# Patient Record
Sex: FEMALE | Race: WHITE | NOT HISPANIC OR LATINO
[De-identification: names, ages, dates, MRNs, and addresses within clinical notes are randomized per-mention and may not be internally consistent; named-entity substitution may affect disease eponyms.]

---

## 2018-11-09 ENCOUNTER — APPOINTMENT (OUTPATIENT)
Dept: FAMILY MEDICINE | Facility: CLINIC | Age: 33
End: 2018-11-09
Payer: COMMERCIAL

## 2018-11-09 VITALS
HEIGHT: 63 IN | DIASTOLIC BLOOD PRESSURE: 70 MMHG | SYSTOLIC BLOOD PRESSURE: 120 MMHG | WEIGHT: 135 LBS | OXYGEN SATURATION: 98 % | HEART RATE: 93 BPM | BODY MASS INDEX: 23.92 KG/M2

## 2018-11-09 DIAGNOSIS — Z00.00 ENCOUNTER FOR GENERAL ADULT MEDICAL EXAMINATION W/OUT ABNORMAL FINDINGS: ICD-10-CM

## 2018-11-09 DIAGNOSIS — Z82.49 FAMILY HISTORY OF ISCHEMIC HEART DISEASE AND OTHER DISEASES OF THE CIRCULATORY SYSTEM: ICD-10-CM

## 2018-11-09 DIAGNOSIS — Z13.220 ENCOUNTER FOR SCREENING FOR LIPOID DISORDERS: ICD-10-CM

## 2018-11-09 DIAGNOSIS — Z78.9 OTHER SPECIFIED HEALTH STATUS: ICD-10-CM

## 2018-11-09 DIAGNOSIS — G89.29 PAIN IN RIGHT KNEE: ICD-10-CM

## 2018-11-09 DIAGNOSIS — M25.562 PAIN IN RIGHT KNEE: ICD-10-CM

## 2018-11-09 DIAGNOSIS — Z13.1 ENCOUNTER FOR SCREENING FOR DIABETES MELLITUS: ICD-10-CM

## 2018-11-09 DIAGNOSIS — M25.561 PAIN IN RIGHT KNEE: ICD-10-CM

## 2018-11-09 PROCEDURE — 99385 PREV VISIT NEW AGE 18-39: CPT

## 2018-11-09 PROCEDURE — 99204 OFFICE O/P NEW MOD 45 MIN: CPT

## 2018-11-09 NOTE — PHYSICAL EXAM
[No Acute Distress] : no acute distress [Well-Appearing] : well-appearing [Normal Sclera/Conjunctiva] : normal sclera/conjunctiva [PERRL] : pupils equal round and reactive to light [Normal Outer Ear/Nose] : the outer ears and nose were normal in appearance [Normal Oropharynx] : the oropharynx was normal [Supple] : supple [No Respiratory Distress] : no respiratory distress  [Clear to Auscultation] : lungs were clear to auscultation bilaterally [Normal Rate] : normal rate  [Regular Rhythm] : with a regular rhythm [Soft] : abdomen soft [Non Tender] : non-tender [Non-distended] : non-distended [Normal Bowel Sounds] : normal bowel sounds [No CVA Tenderness] : no CVA  tenderness [No Joint Swelling] : no joint swelling [Grossly Normal Strength/Tone] : grossly normal strength/tone [No Rash] : no rash [Normal Gait] : normal gait [Coordination Grossly Intact] : coordination grossly intact [No Focal Deficits] : no focal deficits [Normal Affect] : the affect was normal [Normal Insight/Judgement] : insight and judgment were intact [Normal Appearance] : normal in appearance [No Masses] : no palpable masses [No Nipple Discharge] : no nipple discharge [No Axillary Lymphadenopathy] : no axillary lymphadenopathy

## 2018-11-09 NOTE — PLAN
[FreeTextEntry1] : HCM- f/u routine labs, gyn referral to establish care, breast US as pt with family history and dense findings on exam\par Knee pain- f/u RF and TEVIN

## 2018-11-09 NOTE — REVIEW OF SYSTEMS
[Fever] : no fever [Chills] : no chills [Fatigue] : no fatigue [Discharge] : no discharge [Pain] : no pain [Redness] : no redness [Earache] : no earache [Hearing Loss] : no hearing loss [Nosebleed] : no nosebleeds [Chest Pain] : no chest pain [Palpitations] : no palpitations [Shortness Of Breath] : no shortness of breath [Wheezing] : no wheezing [Abdominal Pain] : no abdominal pain [Nausea] : no nausea [Constipation] : no constipation [Diarrhea] : diarrhea [Dysuria] : no dysuria [Incontinence] : no incontinence [Hematuria] : no hematuria [Frequency] : no frequency [Joint Pain] : no joint pain [Joint Stiffness] : no joint stiffness [Joint Swelling] : no joint swelling [Itching] : no itching [Skin Rash] : no skin rash [Headache] : no headache [Dizziness] : no dizziness [Fainting] : no fainting [Anxiety] : no anxiety [Depression] : no depression

## 2018-11-09 NOTE — HISTORY OF PRESENT ILLNESS
[FreeTextEntry1] : CPE [de-identified] : Here for CPE and to establish care. Feels well. States when she works out she gets knee pain that worsens with weather. Denies associated fever\par Social History: Household members: spouse. She is . The patient is never a smoker. She reports no alcohol use. She has never used illicit drugs. AhsjbMpbgkyp7Hro NfkrfMbhblju39Gsfte \par General Health: HvdguVbwfplh41Uzx IpmsxNzporpy80Czpht The patient's health since the last visit is described as good. DyfwwXxlpvyd26Vad EarblWhpehbr78Ldrgq She has regular dental visits. She denies vision problems. She denies hearing loss. QjffaUbjqvqd86Ixa KdkwlNtmcjzt27Lgdjr \par Lifestyle:. She does not have any weight concerns. She exercises regularly. She exercises 2-3 x week  for 30 or more minutes per session. Exercise includes running/jogging and strength training. JchakEtevhhz09Zfl FwvmwPnqqvpi717Kxhtf \par Reproductive health:. she reports abnormal menses. she is sexually active. pregnancy history:  none PhakfKyblpbg383Sdj HljhhLhwnvup036Sfrvj \par Screening: YdmqxKcyrqej747Gfj XkibjWajcrrl146Hhwxw Cervical cancer screening includes a pap smear performed last year. She hasn't been previously screened for colorectal cancer. WcfzvSfytgfb247Laa PrwgjCerwmnn134Tdtgy \par Metabolic screening includes lipid profile performed within the past five years, glucose screening performed last year and thyroid function test performed last year. ZgpixIaimiir696Pzl DjzxkYucztky139Kqjbt AicozItqdmzc709Xtk TkhnmOcljfbl31Rmwrn TjpdxJrwpgqm99Yvb QggckGcqnaek801Wivpz NtiyoNaaxlyo328Rei ,TgbocQuapwq-QYBSG2hok4bu7-[id number]\par

## 2018-11-12 LAB
25(OH)D3 SERPL-MCNC: 27 NG/ML
ALBUMIN SERPL ELPH-MCNC: 5.2 G/DL
ALP BLD-CCNC: 61 U/L
ALT SERPL-CCNC: 11 U/L
ANA SER IF-ACNC: NEGATIVE
ANION GAP SERPL CALC-SCNC: 15 MMOL/L
AST SERPL-CCNC: 21 U/L
BASOPHILS # BLD AUTO: 0.04 K/UL
BASOPHILS NFR BLD AUTO: 0.4 %
BILIRUB SERPL-MCNC: 0.3 MG/DL
BUN SERPL-MCNC: 12 MG/DL
CALCIUM SERPL-MCNC: 9.9 MG/DL
CHLORIDE SERPL-SCNC: 102 MMOL/L
CHOLEST SERPL-MCNC: 176 MG/DL
CHOLEST/HDLC SERPL: 2.8 RATIO
CO2 SERPL-SCNC: 25 MMOL/L
CREAT SERPL-MCNC: 0.85 MG/DL
EOSINOPHIL # BLD AUTO: 0.16 K/UL
EOSINOPHIL NFR BLD AUTO: 1.7 %
GLUCOSE SERPL-MCNC: 93 MG/DL
HBA1C MFR BLD HPLC: 5.2 %
HCT VFR BLD CALC: 42.5 %
HDLC SERPL-MCNC: 63 MG/DL
HGB BLD-MCNC: 14.6 G/DL
IMM GRANULOCYTES NFR BLD AUTO: 0.2 %
LDLC SERPL CALC-MCNC: 100 MG/DL
LYMPHOCYTES # BLD AUTO: 2.07 K/UL
LYMPHOCYTES NFR BLD AUTO: 22.4 %
MAN DIFF?: NORMAL
MCHC RBC-ENTMCNC: 29.9 PG
MCHC RBC-ENTMCNC: 34.4 GM/DL
MCV RBC AUTO: 87.1 FL
MONOCYTES # BLD AUTO: 0.49 K/UL
MONOCYTES NFR BLD AUTO: 5.3 %
NEUTROPHILS # BLD AUTO: 6.45 K/UL
NEUTROPHILS NFR BLD AUTO: 70 %
PLATELET # BLD AUTO: 282 K/UL
POTASSIUM SERPL-SCNC: 4.4 MMOL/L
PROT SERPL-MCNC: 8.2 G/DL
RBC # BLD: 4.88 M/UL
RBC # FLD: 12.5 %
RHEUMATOID FACT SER QL: <10 IU/ML
SODIUM SERPL-SCNC: 142 MMOL/L
TRIGL SERPL-MCNC: 63 MG/DL
TSH SERPL-ACNC: 1.42 UIU/ML
WBC # FLD AUTO: 9.23 K/UL

## 2018-12-13 ENCOUNTER — OUTPATIENT (OUTPATIENT)
Dept: OUTPATIENT SERVICES | Facility: HOSPITAL | Age: 33
LOS: 1 days | End: 2018-12-13

## 2018-12-13 ENCOUNTER — APPOINTMENT (OUTPATIENT)
Dept: ULTRASOUND IMAGING | Facility: CLINIC | Age: 33
End: 2018-12-13

## 2018-12-13 DIAGNOSIS — Z00.8 ENCOUNTER FOR OTHER GENERAL EXAMINATION: ICD-10-CM

## 2018-12-13 DIAGNOSIS — Z00.00 ENCOUNTER FOR GENERAL ADULT MEDICAL EXAMINATION WITHOUT ABNORMAL FINDINGS: ICD-10-CM

## 2019-04-23 ENCOUNTER — ASOB RESULT (OUTPATIENT)
Age: 34
End: 2019-04-23

## 2019-04-23 ENCOUNTER — APPOINTMENT (OUTPATIENT)
Dept: ANTEPARTUM | Facility: CLINIC | Age: 34
End: 2019-04-23
Payer: COMMERCIAL

## 2019-04-23 PROCEDURE — 76801 OB US < 14 WKS SINGLE FETUS: CPT

## 2019-04-23 PROCEDURE — 76813 OB US NUCHAL MEAS 1 GEST: CPT | Mod: 59

## 2019-04-25 ENCOUNTER — TRANSCRIPTION ENCOUNTER (OUTPATIENT)
Age: 34
End: 2019-04-25

## 2019-06-19 ENCOUNTER — APPOINTMENT (OUTPATIENT)
Dept: ANTEPARTUM | Facility: CLINIC | Age: 34
End: 2019-06-19
Payer: COMMERCIAL

## 2019-06-19 ENCOUNTER — ASOB RESULT (OUTPATIENT)
Age: 34
End: 2019-06-19

## 2019-06-19 PROCEDURE — 76805 OB US >/= 14 WKS SNGL FETUS: CPT

## 2019-08-20 ENCOUNTER — OUTPATIENT (OUTPATIENT)
Dept: INPATIENT UNIT | Facility: HOSPITAL | Age: 34
LOS: 1 days | Discharge: OTHER INSTITUTION | End: 2019-08-20
Payer: COMMERCIAL

## 2019-08-20 DIAGNOSIS — Z34.90 ENCOUNTER FOR SUPERVISION OF NORMAL PREGNANCY, UNSPECIFIED, UNSPECIFIED TRIMESTER: ICD-10-CM

## 2019-08-20 DIAGNOSIS — O47.9 FALSE LABOR, UNSPECIFIED: ICD-10-CM

## 2019-08-20 PROCEDURE — 86901 BLOOD TYPING SEROLOGIC RH(D): CPT

## 2019-08-20 PROCEDURE — 86850 RBC ANTIBODY SCREEN: CPT

## 2019-08-20 PROCEDURE — 36415 COLL VENOUS BLD VENIPUNCTURE: CPT

## 2019-08-20 PROCEDURE — G0463: CPT

## 2019-08-20 PROCEDURE — 85025 COMPLETE CBC W/AUTO DIFF WBC: CPT

## 2019-08-20 PROCEDURE — 86780 TREPONEMA PALLIDUM: CPT

## 2019-08-20 PROCEDURE — 86900 BLOOD TYPING SEROLOGIC ABO: CPT

## 2019-08-20 PROCEDURE — 81001 URINALYSIS AUTO W/SCOPE: CPT

## 2019-08-20 PROCEDURE — 84112 EVAL AMNIOTIC FLUID PROTEIN: CPT

## 2019-08-21 ENCOUNTER — INPATIENT (INPATIENT)
Facility: HOSPITAL | Age: 34
LOS: 1 days | Discharge: HOME | End: 2019-08-23
Attending: OBSTETRICS & GYNECOLOGY | Admitting: OBSTETRICS & GYNECOLOGY
Payer: COMMERCIAL

## 2019-08-21 VITALS
TEMPERATURE: 98 F | DIASTOLIC BLOOD PRESSURE: 87 MMHG | HEART RATE: 109 BPM | OXYGEN SATURATION: 99 % | SYSTOLIC BLOOD PRESSURE: 150 MMHG | RESPIRATION RATE: 22 BRPM

## 2019-08-21 LAB
ALBUMIN SERPL ELPH-MCNC: 3.6 G/DL — SIGNIFICANT CHANGE UP (ref 3.3–5)
ALP SERPL-CCNC: 103 U/L — SIGNIFICANT CHANGE UP (ref 40–120)
ALT FLD-CCNC: 14 U/L — SIGNIFICANT CHANGE UP (ref 10–45)
AMNISURE ROM (RUPTURE OF MEMBRANES): POSITIVE
ANION GAP SERPL CALC-SCNC: 14 MMOL/L — SIGNIFICANT CHANGE UP (ref 5–17)
APPEARANCE UR: CLEAR — SIGNIFICANT CHANGE UP
APPEARANCE UR: CLEAR — SIGNIFICANT CHANGE UP
APTT BLD: 27.1 SEC — LOW (ref 27.5–36.3)
AST SERPL-CCNC: 16 U/L — SIGNIFICANT CHANGE UP (ref 10–40)
BACTERIA # UR AUTO: NEGATIVE — SIGNIFICANT CHANGE UP
BASOPHILS # BLD AUTO: 0 K/UL — SIGNIFICANT CHANGE UP (ref 0–0.2)
BASOPHILS # BLD AUTO: 0.05 K/UL — SIGNIFICANT CHANGE UP (ref 0–0.2)
BASOPHILS NFR BLD AUTO: 0.1 % — SIGNIFICANT CHANGE UP (ref 0–2)
BASOPHILS NFR BLD AUTO: 0.4 % — SIGNIFICANT CHANGE UP (ref 0–2)
BILIRUB SERPL-MCNC: 0.1 MG/DL — LOW (ref 0.2–1.2)
BILIRUB UR-MCNC: NEGATIVE — SIGNIFICANT CHANGE UP
BILIRUB UR-MCNC: NEGATIVE — SIGNIFICANT CHANGE UP
BLD GP AB SCN SERPL QL: NEGATIVE — SIGNIFICANT CHANGE UP
BUN SERPL-MCNC: 7 MG/DL — SIGNIFICANT CHANGE UP (ref 7–23)
CALCIUM SERPL-MCNC: 9.2 MG/DL — SIGNIFICANT CHANGE UP (ref 8.4–10.5)
CHLORIDE SERPL-SCNC: 105 MMOL/L — SIGNIFICANT CHANGE UP (ref 96–108)
CO2 SERPL-SCNC: 17 MMOL/L — LOW (ref 22–31)
COLOR SPEC: SIGNIFICANT CHANGE UP
COLOR SPEC: YELLOW — SIGNIFICANT CHANGE UP
CREAT SERPL-MCNC: 0.6 MG/DL — SIGNIFICANT CHANGE UP (ref 0.5–1.3)
DIFF PNL FLD: ABNORMAL
DIFF PNL FLD: ABNORMAL
EOSINOPHIL # BLD AUTO: 0 K/UL — SIGNIFICANT CHANGE UP (ref 0–0.5)
EOSINOPHIL # BLD AUTO: 0.1 K/UL — SIGNIFICANT CHANGE UP (ref 0–0.5)
EOSINOPHIL NFR BLD AUTO: 0.2 % — SIGNIFICANT CHANGE UP (ref 0–6)
EOSINOPHIL NFR BLD AUTO: 0.7 % — SIGNIFICANT CHANGE UP (ref 0–6)
EPI CELLS # UR: 2 /HPF — SIGNIFICANT CHANGE UP
FIBRINOGEN PPP-MCNC: 1000 MG/DL — HIGH (ref 350–510)
GLUCOSE SERPL-MCNC: 114 MG/DL — HIGH (ref 70–99)
GLUCOSE UR QL: NEGATIVE MG/DL — SIGNIFICANT CHANGE UP
GLUCOSE UR QL: NEGATIVE — SIGNIFICANT CHANGE UP
HCT VFR BLD CALC: 36.5 % — SIGNIFICANT CHANGE UP (ref 34.5–45)
HCT VFR BLD CALC: 38 % — SIGNIFICANT CHANGE UP (ref 34.5–45)
HGB BLD-MCNC: 12.3 G/DL — SIGNIFICANT CHANGE UP (ref 11.5–15.5)
HGB BLD-MCNC: 12.9 G/DL — SIGNIFICANT CHANGE UP (ref 11.5–15.5)
HYALINE CASTS # UR AUTO: 1 /LPF — SIGNIFICANT CHANGE UP (ref 0–2)
IMM GRANULOCYTES NFR BLD AUTO: 0.7 % — SIGNIFICANT CHANGE UP (ref 0–1.5)
INR BLD: 0.9 RATIO — SIGNIFICANT CHANGE UP (ref 0.88–1.16)
KETONES UR-MCNC: NEGATIVE — SIGNIFICANT CHANGE UP
KETONES UR-MCNC: SIGNIFICANT CHANGE UP
LEUKOCYTE ESTERASE UR-ACNC: NEGATIVE — SIGNIFICANT CHANGE UP
LEUKOCYTE ESTERASE UR-ACNC: NEGATIVE — SIGNIFICANT CHANGE UP
LYMPHOCYTES # BLD AUTO: 1.3 K/UL — SIGNIFICANT CHANGE UP (ref 1–3.3)
LYMPHOCYTES # BLD AUTO: 17.1 % — SIGNIFICANT CHANGE UP (ref 13–44)
LYMPHOCYTES # BLD AUTO: 2.31 K/UL — SIGNIFICANT CHANGE UP (ref 1–3.3)
LYMPHOCYTES # BLD AUTO: 7 % — LOW (ref 13–44)
MAGNESIUM SERPL-MCNC: 5 MG/DL — HIGH (ref 1.6–2.6)
MCHC RBC-ENTMCNC: 29.7 PG — SIGNIFICANT CHANGE UP (ref 27–34)
MCHC RBC-ENTMCNC: 30.2 PG — SIGNIFICANT CHANGE UP (ref 27–34)
MCHC RBC-ENTMCNC: 33.7 GM/DL — SIGNIFICANT CHANGE UP (ref 32–36)
MCHC RBC-ENTMCNC: 33.9 GM/DL — SIGNIFICANT CHANGE UP (ref 32–36)
MCV RBC AUTO: 88.2 FL — SIGNIFICANT CHANGE UP (ref 80–100)
MCV RBC AUTO: 89.1 FL — SIGNIFICANT CHANGE UP (ref 80–100)
MONOCYTES # BLD AUTO: 0.4 K/UL — SIGNIFICANT CHANGE UP (ref 0–0.9)
MONOCYTES # BLD AUTO: 1.15 K/UL — HIGH (ref 0–0.9)
MONOCYTES NFR BLD AUTO: 2.1 % — SIGNIFICANT CHANGE UP (ref 2–14)
MONOCYTES NFR BLD AUTO: 8.5 % — SIGNIFICANT CHANGE UP (ref 2–14)
NEUTROPHILS # BLD AUTO: 17.2 K/UL — HIGH (ref 1.8–7.4)
NEUTROPHILS # BLD AUTO: 9.77 K/UL — HIGH (ref 1.8–7.4)
NEUTROPHILS NFR BLD AUTO: 72.6 % — SIGNIFICANT CHANGE UP (ref 43–77)
NEUTROPHILS NFR BLD AUTO: 90.5 % — HIGH (ref 43–77)
NITRITE UR-MCNC: NEGATIVE — SIGNIFICANT CHANGE UP
NITRITE UR-MCNC: NEGATIVE — SIGNIFICANT CHANGE UP
PH UR: 6 — SIGNIFICANT CHANGE UP (ref 5–8)
PH UR: 7 — SIGNIFICANT CHANGE UP (ref 5–8)
PLATELET # BLD AUTO: 214 K/UL — SIGNIFICANT CHANGE UP (ref 150–400)
PLATELET # BLD AUTO: 222 K/UL — SIGNIFICANT CHANGE UP (ref 150–400)
POTASSIUM SERPL-MCNC: 3.9 MMOL/L — SIGNIFICANT CHANGE UP (ref 3.5–5.3)
POTASSIUM SERPL-SCNC: 3.9 MMOL/L — SIGNIFICANT CHANGE UP (ref 3.5–5.3)
PROT SERPL-MCNC: 6.8 G/DL — SIGNIFICANT CHANGE UP (ref 6–8.3)
PROT UR-MCNC: NEGATIVE MG/DL — SIGNIFICANT CHANGE UP
PROT UR-MCNC: NEGATIVE — SIGNIFICANT CHANGE UP
PROTHROM AB SERPL-ACNC: 10.3 SEC — SIGNIFICANT CHANGE UP (ref 10–12.9)
RBC # BLD: 4.14 M/UL — SIGNIFICANT CHANGE UP (ref 3.8–5.2)
RBC # BLD: 4.26 M/UL — SIGNIFICANT CHANGE UP (ref 3.8–5.2)
RBC # FLD: 11.3 % — SIGNIFICANT CHANGE UP (ref 10.3–14.5)
RBC # FLD: 11.9 % — SIGNIFICANT CHANGE UP (ref 10.3–14.5)
RBC CASTS # UR COMP ASSIST: 4 /HPF — SIGNIFICANT CHANGE UP (ref 0–4)
RH IG SCN BLD-IMP: POSITIVE — SIGNIFICANT CHANGE UP
RH IG SCN BLD-IMP: POSITIVE — SIGNIFICANT CHANGE UP
SODIUM SERPL-SCNC: 136 MMOL/L — SIGNIFICANT CHANGE UP (ref 135–145)
SP GR SPEC: 1 — LOW (ref 1.01–1.02)
SP GR SPEC: 1.01 — SIGNIFICANT CHANGE UP (ref 1.01–1.02)
T PALLIDUM AB TITR SER: NEGATIVE — SIGNIFICANT CHANGE UP
T PALLIDUM AB TITR SER: NEGATIVE — SIGNIFICANT CHANGE UP
URATE SERPL-MCNC: 3 MG/DL — SIGNIFICANT CHANGE UP (ref 2.5–7)
UROBILINOGEN FLD QL: NEGATIVE MG/DL — SIGNIFICANT CHANGE UP
UROBILINOGEN FLD QL: NEGATIVE — SIGNIFICANT CHANGE UP
WBC # BLD: 13.47 K/UL — HIGH (ref 3.8–10.5)
WBC # BLD: 19 K/UL — HIGH (ref 3.8–10.5)
WBC # FLD AUTO: 13.47 K/UL — HIGH (ref 3.8–10.5)
WBC # FLD AUTO: 19 K/UL — HIGH (ref 3.8–10.5)
WBC UR QL: 0 /HPF — SIGNIFICANT CHANGE UP (ref 0–5)

## 2019-08-21 PROCEDURE — 59409 OBSTETRICAL CARE: CPT | Mod: U7

## 2019-08-21 PROCEDURE — 88307 TISSUE EXAM BY PATHOLOGIST: CPT | Mod: 26

## 2019-08-21 RX ORDER — AZITHROMYCIN 500 MG/1
500 TABLET, FILM COATED ORAL ONCE
Refills: 0 | Status: COMPLETED | OUTPATIENT
Start: 2019-08-21 | End: 2019-08-21

## 2019-08-21 RX ORDER — DIBUCAINE 1 %
1 OINTMENT (GRAM) RECTAL EVERY 6 HOURS
Refills: 0 | Status: DISCONTINUED | OUTPATIENT
Start: 2019-08-21 | End: 2019-08-23

## 2019-08-21 RX ORDER — AMPICILLIN TRIHYDRATE 250 MG
2 CAPSULE ORAL EVERY 6 HOURS
Refills: 0 | Status: DISCONTINUED | OUTPATIENT
Start: 2019-08-21 | End: 2019-08-21

## 2019-08-21 RX ORDER — PRAMOXINE HYDROCHLORIDE 150 MG/15G
1 AEROSOL, FOAM RECTAL EVERY 4 HOURS
Refills: 0 | Status: DISCONTINUED | OUTPATIENT
Start: 2019-08-21 | End: 2019-08-23

## 2019-08-21 RX ORDER — OXYCODONE HYDROCHLORIDE 5 MG/1
5 TABLET ORAL ONCE
Refills: 0 | Status: DISCONTINUED | OUTPATIENT
Start: 2019-08-21 | End: 2019-08-23

## 2019-08-21 RX ORDER — AMPICILLIN TRIHYDRATE 250 MG
2 CAPSULE ORAL ONCE
Refills: 0 | Status: DISCONTINUED | OUTPATIENT
Start: 2019-08-21 | End: 2019-08-21

## 2019-08-21 RX ORDER — MAGNESIUM SULFATE 500 MG/ML
4 VIAL (ML) INJECTION ONCE
Refills: 0 | Status: DISCONTINUED | OUTPATIENT
Start: 2019-08-21 | End: 2019-08-21

## 2019-08-21 RX ORDER — TETANUS TOXOID, REDUCED DIPHTHERIA TOXOID AND ACELLULAR PERTUSSIS VACCINE, ADSORBED 5; 2.5; 8; 8; 2.5 [IU]/.5ML; [IU]/.5ML; UG/.5ML; UG/.5ML; UG/.5ML
0.5 SUSPENSION INTRAMUSCULAR ONCE
Refills: 0 | Status: DISCONTINUED | OUTPATIENT
Start: 2019-08-21 | End: 2019-08-23

## 2019-08-21 RX ORDER — AMPICILLIN TRIHYDRATE 250 MG
CAPSULE ORAL
Refills: 0 | Status: DISCONTINUED | OUTPATIENT
Start: 2019-08-21 | End: 2019-08-21

## 2019-08-21 RX ORDER — SODIUM CHLORIDE 9 MG/ML
1000 INJECTION, SOLUTION INTRAVENOUS
Refills: 0 | Status: DISCONTINUED | OUTPATIENT
Start: 2019-08-21 | End: 2019-08-21

## 2019-08-21 RX ORDER — MAGNESIUM SULFATE 500 MG/ML
4 VIAL (ML) INJECTION ONCE
Refills: 0 | Status: COMPLETED | OUTPATIENT
Start: 2019-08-21 | End: 2019-08-21

## 2019-08-21 RX ORDER — CITRIC ACID/SODIUM CITRATE 300-500 MG
30 SOLUTION, ORAL ORAL ONCE
Refills: 0 | Status: DISCONTINUED | OUTPATIENT
Start: 2019-08-21 | End: 2019-08-21

## 2019-08-21 RX ORDER — BENZOCAINE 10 %
1 GEL (GRAM) MUCOUS MEMBRANE EVERY 6 HOURS
Refills: 0 | Status: DISCONTINUED | OUTPATIENT
Start: 2019-08-21 | End: 2019-08-23

## 2019-08-21 RX ORDER — LANOLIN
1 OINTMENT (GRAM) TOPICAL EVERY 6 HOURS
Refills: 0 | Status: DISCONTINUED | OUTPATIENT
Start: 2019-08-21 | End: 2019-08-23

## 2019-08-21 RX ORDER — ACETAMINOPHEN 500 MG
975 TABLET ORAL
Refills: 0 | Status: DISCONTINUED | OUTPATIENT
Start: 2019-08-21 | End: 2019-08-23

## 2019-08-21 RX ORDER — AER TRAVELER 0.5 G/1
1 SOLUTION RECTAL; TOPICAL EVERY 4 HOURS
Refills: 0 | Status: DISCONTINUED | OUTPATIENT
Start: 2019-08-21 | End: 2019-08-23

## 2019-08-21 RX ORDER — MAGNESIUM SULFATE 500 MG/ML
2 VIAL (ML) INJECTION
Qty: 40 | Refills: 0 | Status: DISCONTINUED | OUTPATIENT
Start: 2019-08-21 | End: 2019-08-21

## 2019-08-21 RX ORDER — AMOXICILLIN 250 MG/5ML
500 SUSPENSION, RECONSTITUTED, ORAL (ML) ORAL EVERY 8 HOURS
Refills: 0 | Status: DISCONTINUED | OUTPATIENT
Start: 2019-08-21 | End: 2019-08-21

## 2019-08-21 RX ORDER — SIMETHICONE 80 MG/1
80 TABLET, CHEWABLE ORAL EVERY 4 HOURS
Refills: 0 | Status: DISCONTINUED | OUTPATIENT
Start: 2019-08-21 | End: 2019-08-23

## 2019-08-21 RX ORDER — DOCUSATE SODIUM 100 MG
100 CAPSULE ORAL
Refills: 0 | Status: DISCONTINUED | OUTPATIENT
Start: 2019-08-21 | End: 2019-08-23

## 2019-08-21 RX ORDER — FOLIC ACID 0.8 MG
1 TABLET ORAL DAILY
Refills: 0 | Status: DISCONTINUED | OUTPATIENT
Start: 2019-08-21 | End: 2019-08-21

## 2019-08-21 RX ORDER — AZITHROMYCIN 500 MG/1
1000 TABLET, FILM COATED ORAL ONCE
Refills: 0 | Status: DISCONTINUED | OUTPATIENT
Start: 2019-08-21 | End: 2019-08-21

## 2019-08-21 RX ORDER — IBUPROFEN 200 MG
600 TABLET ORAL EVERY 6 HOURS
Refills: 0 | Status: COMPLETED | OUTPATIENT
Start: 2019-08-21 | End: 2020-07-19

## 2019-08-21 RX ORDER — AMPICILLIN TRIHYDRATE 250 MG
2 CAPSULE ORAL ONCE
Refills: 0 | Status: COMPLETED | OUTPATIENT
Start: 2019-08-21 | End: 2019-08-21

## 2019-08-21 RX ORDER — OXYTOCIN 10 UNIT/ML
333.33 VIAL (ML) INJECTION
Qty: 20 | Refills: 0 | Status: DISCONTINUED | OUTPATIENT
Start: 2019-08-21 | End: 2019-08-23

## 2019-08-21 RX ORDER — IBUPROFEN 200 MG
600 TABLET ORAL EVERY 6 HOURS
Refills: 0 | Status: DISCONTINUED | OUTPATIENT
Start: 2019-08-21 | End: 2019-08-23

## 2019-08-21 RX ORDER — SODIUM CHLORIDE 9 MG/ML
1000 INJECTION, SOLUTION INTRAVENOUS
Refills: 0 | Status: DISCONTINUED | OUTPATIENT
Start: 2019-08-21 | End: 2019-08-23

## 2019-08-21 RX ORDER — SODIUM CHLORIDE 9 MG/ML
3 INJECTION INTRAMUSCULAR; INTRAVENOUS; SUBCUTANEOUS EVERY 8 HOURS
Refills: 0 | Status: DISCONTINUED | OUTPATIENT
Start: 2019-08-21 | End: 2019-08-23

## 2019-08-21 RX ORDER — KETOROLAC TROMETHAMINE 30 MG/ML
30 SYRINGE (ML) INJECTION ONCE
Refills: 0 | Status: DISCONTINUED | OUTPATIENT
Start: 2019-08-21 | End: 2019-08-21

## 2019-08-21 RX ORDER — HYDROCORTISONE 1 %
1 OINTMENT (GRAM) TOPICAL EVERY 6 HOURS
Refills: 0 | Status: DISCONTINUED | OUTPATIENT
Start: 2019-08-21 | End: 2019-08-23

## 2019-08-21 RX ORDER — OXYCODONE HYDROCHLORIDE 5 MG/1
5 TABLET ORAL
Refills: 0 | Status: DISCONTINUED | OUTPATIENT
Start: 2019-08-21 | End: 2019-08-23

## 2019-08-21 RX ORDER — MAGNESIUM HYDROXIDE 400 MG/1
30 TABLET, CHEWABLE ORAL
Refills: 0 | Status: DISCONTINUED | OUTPATIENT
Start: 2019-08-21 | End: 2019-08-23

## 2019-08-21 RX ORDER — OXYTOCIN 10 UNIT/ML
333.33 VIAL (ML) INJECTION
Qty: 20 | Refills: 0 | Status: DISCONTINUED | OUTPATIENT
Start: 2019-08-21 | End: 2019-08-21

## 2019-08-21 RX ORDER — DIPHENHYDRAMINE HCL 50 MG
25 CAPSULE ORAL EVERY 6 HOURS
Refills: 0 | Status: DISCONTINUED | OUTPATIENT
Start: 2019-08-21 | End: 2019-08-23

## 2019-08-21 RX ORDER — GLYCERIN ADULT
1 SUPPOSITORY, RECTAL RECTAL AT BEDTIME
Refills: 0 | Status: DISCONTINUED | OUTPATIENT
Start: 2019-08-21 | End: 2019-08-23

## 2019-08-21 RX ADMIN — Medication 50 GM/HR: at 01:10

## 2019-08-21 RX ADMIN — Medication 12 MILLIGRAM(S): at 01:07

## 2019-08-21 RX ADMIN — Medication 30 MILLIGRAM(S): at 09:42

## 2019-08-21 RX ADMIN — Medication 200 GRAM(S): at 00:30

## 2019-08-21 RX ADMIN — Medication 216 GRAM(S): at 06:30

## 2019-08-21 RX ADMIN — Medication 1000 MILLIUNIT(S)/MIN: at 09:10

## 2019-08-21 RX ADMIN — SODIUM CHLORIDE 125 MILLILITER(S): 9 INJECTION, SOLUTION INTRAVENOUS at 01:12

## 2019-08-21 RX ADMIN — AZITHROMYCIN 255 MILLIGRAM(S): 500 TABLET, FILM COATED ORAL at 01:20

## 2019-08-21 RX ADMIN — SODIUM CHLORIDE 3 MILLILITER(S): 9 INJECTION INTRAMUSCULAR; INTRAVENOUS; SUBCUTANEOUS at 17:32

## 2019-08-21 RX ADMIN — SODIUM CHLORIDE 3 MILLILITER(S): 9 INJECTION INTRAMUSCULAR; INTRAVENOUS; SUBCUTANEOUS at 22:05

## 2019-08-21 RX ADMIN — Medication 216 GRAM(S): at 00:30

## 2019-08-22 DIAGNOSIS — Z34.90 ENCOUNTER FOR SUPERVISION OF NORMAL PREGNANCY, UNSPECIFIED, UNSPECIFIED TRIMESTER: ICD-10-CM

## 2019-08-22 LAB
CULTURE RESULTS: NO GROWTH — SIGNIFICANT CHANGE UP
GROUP B BETA STREP DNA (PCR): SIGNIFICANT CHANGE UP
GROUP B BETA STREP INTERPRETATION: SIGNIFICANT CHANGE UP
HCT VFR BLD CALC: 35.1 % — SIGNIFICANT CHANGE UP (ref 34.5–45)
HGB BLD-MCNC: 11.4 G/DL — LOW (ref 11.5–15.5)
SOURCE GROUP B STREP: SIGNIFICANT CHANGE UP
SPECIMEN SOURCE: SIGNIFICANT CHANGE UP

## 2019-08-22 RX ADMIN — Medication 1 TABLET(S): at 15:56

## 2019-08-22 NOTE — PROGRESS NOTE ADULT - PROBLEM SELECTOR PLAN 1
- Pain well controlled, continue current pain regimen  - Increase ambulation, SCDs when not ambulating  - Continue regular diet    Reagan Wyman PGY1

## 2019-08-22 NOTE — PROGRESS NOTE ADULT - SUBJECTIVE AND OBJECTIVE BOX
OB Progress Note:  PPD#1    S: 33yo  PPD#1 s/p . Patient feels well. Pain is well controlled. She is tolerating a regular diet and passing flatus. She is voiding spontaneously, and ambulating without difficulty. Denies CP/SOB. Denies lightheadedness/dizziness. Denies N/V.    O:  Vitals:  Vital Signs Last 24 Hrs  T(C): 36.6 (22 Aug 2019 06:13), Max: 36.9 (21 Aug 2019 11:30)  T(F): 97.8 (22 Aug 2019 06:13), Max: 98.4 (21 Aug 2019 11:30)  HR: 81 (22 Aug 2019 06:13) (81 - 109)  BP: 118/81 (22 Aug 2019 06:13) (115/75 - 150/87)  BP(mean): --  RR: 18 (22 Aug 2019 06:13) (14 - 22)  SpO2: 98% (22 Aug 2019 06:13) (96% - 100%)    MEDICATIONS  (STANDING):  acetaminophen   Tablet .. 975 milliGRAM(s) Oral <User Schedule>  diphtheria/tetanus/pertussis (acellular) Vaccine (ADAcel) 0.5 milliLiter(s) IntraMuscular once  ibuprofen  Tablet. 600 milliGRAM(s) Oral every 6 hours  lactated ringers. 1000 milliLiter(s) (125 mL/Hr) IV Continuous <Continuous>  oxytocin Infusion 333.333 milliUNIT(s)/Min (1000 mL/Hr) IV Continuous <Continuous>  prenatal multivitamin 1 Tablet(s) Oral daily  sodium chloride 0.9% lock flush 3 milliLiter(s) IV Push every 8 hours      Labs:  Blood type: O Positive  Rubella IgG: RPR: Negative                          12.9   19.0<H> >-----------< 222    (  @ 03:55 )             38.0                        12.3   13.47<H> >-----------< 214    (  @ 01:42 )             36.5    - @ 03:55      136  |  105  |  7   ----------------------------<  114<H>  3.9   |  17<L>  |  0.60        Ca    9.2      21 Aug 2019 03:55  Mg     5.0<H>         TPro  6.8  /  Alb  3.6  /  TBili  0.1<L>  /  DBili  x   /  AST  16  /  ALT  14  /  AlkPhos  103  19 @ 03:55        Physical Exam:  General: NAD  Abdomen: soft, non-tender, non-distended, fundus firm  Vaginal: Lochia wnl  Extremities: No erythema/edema

## 2019-08-23 ENCOUNTER — TRANSCRIPTION ENCOUNTER (OUTPATIENT)
Age: 34
End: 2019-08-23

## 2019-08-23 ENCOUNTER — APPOINTMENT (OUTPATIENT)
Dept: ANTEPARTUM | Facility: CLINIC | Age: 34
End: 2019-08-23

## 2019-08-23 VITALS
TEMPERATURE: 98 F | DIASTOLIC BLOOD PRESSURE: 85 MMHG | SYSTOLIC BLOOD PRESSURE: 132 MMHG | RESPIRATION RATE: 18 BRPM | HEART RATE: 91 BPM

## 2019-08-23 PROCEDURE — 86901 BLOOD TYPING SEROLOGIC RH(D): CPT

## 2019-08-23 PROCEDURE — 85014 HEMATOCRIT: CPT

## 2019-08-23 PROCEDURE — 85384 FIBRINOGEN ACTIVITY: CPT

## 2019-08-23 PROCEDURE — 80053 COMPREHEN METABOLIC PANEL: CPT

## 2019-08-23 PROCEDURE — 59050 FETAL MONITOR W/REPORT: CPT

## 2019-08-23 PROCEDURE — 85610 PROTHROMBIN TIME: CPT

## 2019-08-23 PROCEDURE — 85027 COMPLETE CBC AUTOMATED: CPT

## 2019-08-23 PROCEDURE — 86780 TREPONEMA PALLIDUM: CPT

## 2019-08-23 PROCEDURE — 81001 URINALYSIS AUTO W/SCOPE: CPT

## 2019-08-23 PROCEDURE — 87086 URINE CULTURE/COLONY COUNT: CPT

## 2019-08-23 PROCEDURE — 83735 ASSAY OF MAGNESIUM: CPT

## 2019-08-23 PROCEDURE — 59025 FETAL NON-STRESS TEST: CPT

## 2019-08-23 PROCEDURE — 88307 TISSUE EXAM BY PATHOLOGIST: CPT

## 2019-08-23 PROCEDURE — 86850 RBC ANTIBODY SCREEN: CPT

## 2019-08-23 PROCEDURE — 84550 ASSAY OF BLOOD/URIC ACID: CPT

## 2019-08-23 PROCEDURE — 87653 STREP B DNA AMP PROBE: CPT

## 2019-08-23 PROCEDURE — 85730 THROMBOPLASTIN TIME PARTIAL: CPT

## 2019-08-23 PROCEDURE — 86900 BLOOD TYPING SEROLOGIC ABO: CPT

## 2019-08-23 PROCEDURE — 85018 HEMOGLOBIN: CPT

## 2019-08-23 RX ORDER — ACETAMINOPHEN 500 MG
3 TABLET ORAL
Qty: 0 | Refills: 0 | DISCHARGE
Start: 2019-08-23

## 2019-08-23 RX ORDER — IBUPROFEN 200 MG
1 TABLET ORAL
Qty: 0 | Refills: 0 | DISCHARGE
Start: 2019-08-23

## 2019-08-23 RX ADMIN — Medication 1 TABLET(S): at 10:11

## 2019-08-23 NOTE — PROGRESS NOTE ADULT - SUBJECTIVE AND OBJECTIVE BOX
PPD#2- ATTENDING NOTE    S: Patient doing well. Minimal lochia. Pain controlled.    O: Vital Signs Last 24 Hrs  T(C): 36.9 (23 Aug 2019 09:41), Max: 36.9 (23 Aug 2019 09:41)  T(F): 98.4 (23 Aug 2019 09:41), Max: 98.4 (23 Aug 2019 09:41)  HR: 91 (23 Aug 2019 09:41) (83 - 91)  BP: 132/85 (23 Aug 2019 09:41) (129/87 - 132/85)  BP(mean): --  RR: 18 (23 Aug 2019 09:41) (18 - 18)  SpO2: --    Gen: NAD  Abd: soft, NT, ND, fundus firm below umbilicus  Lochia: moderate  Ext: no tenderness, no hyper reflexia    Labs:                        11.4   x     )-----------( x        ( 22 Aug 2019 08:19 )             35.1       A: 34y PPD# s/p  doing well.    Plan:  Analgesia prn  Regular diet  Discharge instruction given  F/U 4 wks-  pt will see her MD from [place]

## 2019-08-23 NOTE — DISCHARGE NOTE OB - CARE PLAN
Principal Discharge DX:	Vaginal delivery  Goal:	return to baseline health status  Assessment and plan of treatment:	After discharge, please stay on pelvic rest for 6 weeks, meaning no sexual intercourse, no tampons and no douching.  No driving for 2 weeks as women can loose a lot of blood during delivery and there is a possibility of being lightheaded/fainting.  No lifting objects heavier than baby for two weeks.  Expect to have vaginal bleeding/spotting for up to six weeks.  The bleeding should get lighter and more white/light brown with time.  For bleeding soaking more than a pad an hour or passing clots greater than the size of your fist, come in to the emergency department.    Follow up with your physician in [place] in 2 weeks for a mental wellness check in and 6 weeks for a postpartum visit.

## 2019-08-23 NOTE — DISCHARGE NOTE OB - PATIENT PORTAL LINK FT
You can access the [facility] Patient Portal, offered by [facility], by registering with the following website: [email or web address]

## 2019-08-23 NOTE — DISCHARGE NOTE OB - HOSPITAL COURSE
Patient had uncomplicated, nonsurgical vaginal delivery.  Please see delivery note for details.  During postpartum course patient's vitals were stable, vaginal bleeding appropriate, and pain well controlled.  On day of discharge patient was ambulating, her pain controlled with oral medications, had adequate oral intake, and was voiding freely.  Discharge instructions and precautions were given.  Will return to clinic in 6 weeks for postpartum visit.  Postpartum birth control plan is condoms. Baby boy will be circumcised by patient's pediatrician and she is breastfeeding. Reviewed postpartum depression precautions.

## 2019-08-23 NOTE — DISCHARGE NOTE OB - MEDICATION SUMMARY - MEDICATIONS TO TAKE
I will START or STAY ON the medications listed below when I get home from the hospital:    acetaminophen 325 mg oral tablet  -- 3 tab(s) by mouth   -- Indication: For Pain    ibuprofen 600 mg oral tablet  -- 1 tab(s) by mouth every 6 hours  -- Indication: For Pain    Prenatal Multivitamins with Folic Acid 1 mg oral tablet  -- 1 tab(s) by mouth once a day  -- Indication: For Vitamin

## 2019-08-23 NOTE — DISCHARGE NOTE OB - PLAN OF CARE
return to baseline health status After discharge, please stay on pelvic rest for 6 weeks, meaning no sexual intercourse, no tampons and no douching.  No driving for 2 weeks as women can loose a lot of blood during delivery and there is a possibility of being lightheaded/fainting.  No lifting objects heavier than baby for two weeks.  Expect to have vaginal bleeding/spotting for up to six weeks.  The bleeding should get lighter and more white/light brown with time.  For bleeding soaking more than a pad an hour or passing clots greater than the size of your fist, come in to the emergency department.    Follow up with your physician in [place] in 2 weeks for a mental wellness check in and 6 weeks for a postpartum visit.

## 2019-08-30 LAB — SURGICAL PATHOLOGY STUDY: SIGNIFICANT CHANGE UP

## 2019-09-11 ENCOUNTER — EMERGENCY (EMERGENCY)
Facility: HOSPITAL | Age: 34
LOS: 1 days | End: 2019-09-11
Attending: EMERGENCY MEDICINE
Payer: COMMERCIAL

## 2019-09-11 VITALS
HEIGHT: 63 IN | RESPIRATION RATE: 20 BRPM | SYSTOLIC BLOOD PRESSURE: 151 MMHG | HEART RATE: 113 BPM | DIASTOLIC BLOOD PRESSURE: 91 MMHG | TEMPERATURE: 98 F | OXYGEN SATURATION: 100 % | WEIGHT: 151.9 LBS

## 2019-09-11 LAB
ALBUMIN SERPL ELPH-MCNC: 4.6 G/DL — SIGNIFICANT CHANGE UP (ref 3.3–5)
ALP SERPL-CCNC: 128 U/L — HIGH (ref 40–120)
ALT FLD-CCNC: 33 U/L — SIGNIFICANT CHANGE UP (ref 10–45)
ANION GAP SERPL CALC-SCNC: 18 MMOL/L — HIGH (ref 5–17)
APPEARANCE UR: CLEAR — SIGNIFICANT CHANGE UP
APTT BLD: 35.3 SEC — SIGNIFICANT CHANGE UP (ref 27.5–36.3)
AST SERPL-CCNC: 38 U/L — SIGNIFICANT CHANGE UP (ref 10–40)
BACTERIA # UR AUTO: NEGATIVE — SIGNIFICANT CHANGE UP
BASOPHILS # BLD AUTO: 0 K/UL — SIGNIFICANT CHANGE UP (ref 0–0.2)
BASOPHILS NFR BLD AUTO: 0.1 % — SIGNIFICANT CHANGE UP (ref 0–2)
BILIRUB SERPL-MCNC: 0.2 MG/DL — SIGNIFICANT CHANGE UP (ref 0.2–1.2)
BILIRUB UR-MCNC: NEGATIVE — SIGNIFICANT CHANGE UP
BUN SERPL-MCNC: 10 MG/DL — SIGNIFICANT CHANGE UP (ref 7–23)
CALCIUM SERPL-MCNC: 9.6 MG/DL — SIGNIFICANT CHANGE UP (ref 8.4–10.5)
CHLORIDE SERPL-SCNC: 102 MMOL/L — SIGNIFICANT CHANGE UP (ref 96–108)
CO2 SERPL-SCNC: 20 MMOL/L — LOW (ref 22–31)
COLOR SPEC: SIGNIFICANT CHANGE UP
CREAT SERPL-MCNC: 0.68 MG/DL — SIGNIFICANT CHANGE UP (ref 0.5–1.3)
DIFF PNL FLD: NEGATIVE — SIGNIFICANT CHANGE UP
EOSINOPHIL # BLD AUTO: 0.2 K/UL — SIGNIFICANT CHANGE UP (ref 0–0.5)
EOSINOPHIL NFR BLD AUTO: 1.9 % — SIGNIFICANT CHANGE UP (ref 0–6)
EPI CELLS # UR: 0 /HPF — SIGNIFICANT CHANGE UP
GLUCOSE SERPL-MCNC: 98 MG/DL — SIGNIFICANT CHANGE UP (ref 70–99)
GLUCOSE UR QL: NEGATIVE — SIGNIFICANT CHANGE UP
HCT VFR BLD CALC: 44.1 % — SIGNIFICANT CHANGE UP (ref 34.5–45)
HGB BLD-MCNC: 14.7 G/DL — SIGNIFICANT CHANGE UP (ref 11.5–15.5)
HYALINE CASTS # UR AUTO: 0 /LPF — SIGNIFICANT CHANGE UP (ref 0–2)
INR BLD: 1.03 RATIO — SIGNIFICANT CHANGE UP (ref 0.88–1.16)
KETONES UR-MCNC: NEGATIVE — SIGNIFICANT CHANGE UP
LDH SERPL L TO P-CCNC: 362 U/L — HIGH (ref 50–242)
LEUKOCYTE ESTERASE UR-ACNC: NEGATIVE — SIGNIFICANT CHANGE UP
LYMPHOCYTES # BLD AUTO: 2 K/UL — SIGNIFICANT CHANGE UP (ref 1–3.3)
LYMPHOCYTES # BLD AUTO: 22 % — SIGNIFICANT CHANGE UP (ref 13–44)
MAGNESIUM SERPL-MCNC: 2.2 MG/DL — SIGNIFICANT CHANGE UP (ref 1.6–2.6)
MCHC RBC-ENTMCNC: 29.9 PG — SIGNIFICANT CHANGE UP (ref 27–34)
MCHC RBC-ENTMCNC: 33.4 GM/DL — SIGNIFICANT CHANGE UP (ref 32–36)
MCV RBC AUTO: 89.6 FL — SIGNIFICANT CHANGE UP (ref 80–100)
MONOCYTES # BLD AUTO: 0.5 K/UL — SIGNIFICANT CHANGE UP (ref 0–0.9)
MONOCYTES NFR BLD AUTO: 5.2 % — SIGNIFICANT CHANGE UP (ref 2–14)
NEUTROPHILS # BLD AUTO: 6.3 K/UL — SIGNIFICANT CHANGE UP (ref 1.8–7.4)
NEUTROPHILS NFR BLD AUTO: 70.8 % — SIGNIFICANT CHANGE UP (ref 43–77)
NITRITE UR-MCNC: NEGATIVE — SIGNIFICANT CHANGE UP
PH UR: 6.5 — SIGNIFICANT CHANGE UP (ref 5–8)
PLATELET # BLD AUTO: 264 K/UL — SIGNIFICANT CHANGE UP (ref 150–400)
POTASSIUM SERPL-MCNC: 4.8 MMOL/L — SIGNIFICANT CHANGE UP (ref 3.5–5.3)
POTASSIUM SERPL-SCNC: 4.8 MMOL/L — SIGNIFICANT CHANGE UP (ref 3.5–5.3)
PROT SERPL-MCNC: 7.8 G/DL — SIGNIFICANT CHANGE UP (ref 6–8.3)
PROT UR-MCNC: NEGATIVE — SIGNIFICANT CHANGE UP
PROTHROM AB SERPL-ACNC: 11.7 SEC — SIGNIFICANT CHANGE UP (ref 10–12.9)
RBC # BLD: 4.93 M/UL — SIGNIFICANT CHANGE UP (ref 3.8–5.2)
RBC # FLD: 11.1 % — SIGNIFICANT CHANGE UP (ref 10.3–14.5)
RBC CASTS # UR COMP ASSIST: 0 /HPF — SIGNIFICANT CHANGE UP (ref 0–4)
SODIUM SERPL-SCNC: 140 MMOL/L — SIGNIFICANT CHANGE UP (ref 135–145)
SP GR SPEC: 1 — LOW (ref 1.01–1.02)
URATE SERPL-MCNC: 3.5 MG/DL — SIGNIFICANT CHANGE UP (ref 2.5–7)
UROBILINOGEN FLD QL: NEGATIVE — SIGNIFICANT CHANGE UP
WBC # BLD: 8.9 K/UL — SIGNIFICANT CHANGE UP (ref 3.8–10.5)
WBC # FLD AUTO: 8.9 K/UL — SIGNIFICANT CHANGE UP (ref 3.8–10.5)
WBC UR QL: 1 /HPF — SIGNIFICANT CHANGE UP (ref 0–5)

## 2019-09-11 PROCEDURE — 71046 X-RAY EXAM CHEST 2 VIEWS: CPT | Mod: 26

## 2019-09-11 PROCEDURE — 99284 EMERGENCY DEPT VISIT MOD MDM: CPT

## 2019-09-11 PROCEDURE — 70450 CT HEAD/BRAIN W/O DYE: CPT | Mod: 26

## 2019-09-11 RX ORDER — ACETAMINOPHEN 500 MG
650 TABLET ORAL ONCE
Refills: 0 | Status: COMPLETED | OUTPATIENT
Start: 2019-09-11 | End: 2019-09-11

## 2019-09-11 RX ADMIN — Medication 650 MILLIGRAM(S): at 17:57

## 2019-09-11 RX ADMIN — Medication 650 MILLIGRAM(S): at 19:00

## 2019-09-11 NOTE — CONSULT NOTE ADULT - ASSESSMENT
33 yo  postpartum female s/p  () after PPROM at 30 wks presents with mildly elevated BPs and occipital HA responsive to Tylenol. Currently no severe range BPs requiring IV antihypertensive management. HELLP labs reviewed and wnl. No proteinuria on UA. No severe features of preeclampsia at present given headache has shown improvement with Tylenol.   - Recommend continuous BP monitoring. If BPs persistently >160/110 at least 15 minutes apart, recommend IV antihypertensive management   - Recommend CXR to rule out pulm edema    - CT head reviewed, frontal lobe lucency of uncertain significance. Agree with MRI   - Recommend neurology consult for HA and CT findings     Kailey Abreu PGY2  d/w Dr. Calix

## 2019-09-11 NOTE — ED ADULT TRIAGE NOTE - CHIEF COMPLAINT QUOTE
pt with infant in NICU c/o headache few days today felt SOB while visiting pt  vaginal delivery pre term 30 weeks 2019

## 2019-09-11 NOTE — ED PROVIDER NOTE - CARE PLAN
Principal Discharge DX:	Headache Principal Discharge DX:	Headache  Secondary Diagnosis:	Postpartum hypertension

## 2019-09-11 NOTE — ED PROVIDER NOTE - PHYSICAL EXAMINATION
GEN: Pt in NAD, A&O x3. GCS 15.  PSYCH: Anxious.  EYES: Sclera white w/o injection, PERRLA, EOMI.   ENT: Head NCAT. Nose w/o deformity. No auricular TTP. MMM. Neck supple FROM.   RESP: No chest wall tenderness, CTA b/l, no wheezes, rales, or rhonchi.   CARDIAC: RRR, clear distinct S1, S2, no appreciable murmurs.  ABD: Abdomen soft, non-tender. No CVAT b/l.  VASC: 2+ radial and dorsalis pedis pulses b/l. No edema or calf tenderness.  NEURO: CN2-12 grossly intact. Normal and equal sensation and 5/5 strength UE and LE b/l. Pronator drift negative. Normal gait and gross cerebellar function. DTRs: 2+ biceps, triceps, patellar, and Achilles.  SKIN: No rashes on the trunk.

## 2019-09-11 NOTE — CONSULT NOTE ADULT - SUBJECTIVE AND OBJECTIVE BOX
35 y/o  postpartum female s/p  () after PPROM at 30 wks gestation presents with worsening persistent occipital HA x3 days. Patient reports 7/10 HA at its worst, currently 5/10 s/p Tylenol in the ED. Patient denies changes in vision, lightheadedness, dizziness, n/v, epigastric pain, RUQ pain, focal weakness, numbness, tingling. She reports mild intermittent SOB, but denies CP, cough, orthopnea, LE swelling or pain. PNC c/b shortened cervix and PPROM, otherwise denies h/o hypertension during pregnancy or prior to.     POBHx -  19 , PPROM @ 30 wks   PGynhx - denies   PMHx - denies   PSHx - denies  Meds - none   Allergies - NKDA   Social - denies tobacco, alcohol, drugs    Physical Exam:   General: sitting comfortably in bed, NAD, AOx3   Neck: supple, full ROM, no lymphadenopathy  CV: RRR  Lungs: CTA b/l, good air flow b/l   Abd: Soft, NT, ND    Ext: NT b/l, no edema  Neuro: AOx3, moving all extremities spontaneously, responding to commands appropriately, full strength in b/l upper and lower extremities    Vital Signs Last 24 Hrs  T(C): 36.9 (11 Sep 2019 19:00), Max: 36.9 (11 Sep 2019 16:44)  T(F): 98.4 (11 Sep 2019 19:00), Max: 98.4 (11 Sep 2019 16:44)  HR: 101 (11 Sep 2019 22:15) (86 - 120)  BP: 131/88 (11 Sep 2019 22:15) (125/74 - 154/100)  BP(mean): --  RR: 17 (11 Sep 2019 19:00) (17 - 20)  SpO2: 100% (11 Sep 2019 20:00) (99% - 100%)    I&O's Detail                            14.7   8.9   )-----------( 264      ( 11 Sep 2019 17:54 )             44.1       09-11    140  |  102  |  10  ----------------------------<  98  4.8   |  20<L>  |  0.68    Ca    9.6      11 Sep 2019 17:54  Mg     2.2         TPro  7.8  /  Alb  4.6  /  TBili  0.2  /  DBili  x   /  AST  38  /  ALT  33  /  AlkPhos  128<H>        LIVER FUNCTIONS - ( 11 Sep 2019 17:54 )  Alb: 4.6 g/dL / Pro: 7.8 g/dL / ALK PHOS: 128 U/L / ALT: 33 U/L / AST: 38 U/L / GGT: x           PT/INR - ( 11 Sep 2019 19:56 )   PT: 11.7 sec;   INR: 1.03 ratio     PTT - ( 11 Sep 2019 19:56 )  PTT:35.3 sec    Urinalysis Basic - ( 11 Sep 2019 17:54 )    Color: Light Yellow / Appearance: Clear / S.003 / pH: x  Gluc: x / Ketone: Negative  / Bili: Negative / Urobili: Negative   Blood: x / Protein: Negative / Nitrite: Negative   Leuk Esterase: Negative / RBC: 0 /hpf / WBC 1 /HPF   Sq Epi: x / Non Sq Epi: 0 /hpf / Bacteria: Negative      EXAM:  CT BRAIN                        PROCEDURE DATE:  2019    INTERPRETATION:  Noncontrast CT of the brain.  CLINICAL INDICATION:  Postpartum headache    TECHNIQUE : Axial CT scanning of the brain was obtained from the skull   base to the vertex without the administration of intravenous contrast.      COMPARISON: None available    FINDINGS:    There is a lucency along the inferior aspect of the right frontal lobe of   uncertain significance. No acute hemorrhage, hydrocephalus, midline shift   or extra-axial collections are present. Other visualized osseous   structures are not remarkable in appearance.    The orbits, paranasal sinuses and tympanomastoid cavities are within   normal limits.    Impression:    Right inferior frontal lobe lucency. Further evaluation with   contrast-enhanced brain MRI is recommended for further evaluation.

## 2019-09-11 NOTE — ED PROVIDER NOTE - CLINICAL SUMMARY MEDICAL DECISION MAKING FREE TEXT BOX
[facility]ord, PGY1 - 35y/o F postpartum day 21 after PPROM 30weeks presents with migratory HA x 3 days and SOB intermittently since yesterday. HA bitemporal today occipital, intermittent, no clear palliative or provoking factors. Pt denies change in vision, numbness, paresthesias, weakness, difficulty speaking/swallowing/walking, n/v, f/c. In ED, pt hypertensive 140s systolic. No h/o HTN or other issues complicating pregnancy. DDx pre-eclampsia versus eclampsia, CVA, hypertensive emergency. Plan labs, UA, head CT, Ob/Gyn consult.     Head CT demonstrated indeterminate lucency in R inferior frontal lobe - rec MRI for further eval. Plan consult neurology and ? admission for MRI head.

## 2019-09-11 NOTE — ED PROVIDER NOTE - OBJECTIVE STATEMENT
33 y/o F,  PPROM @ 30wks s/p vaginal delivery 2019,  currently in NICU, otherwise unremarkable pregnancy course, no PMHx presents c/o HA x 3 days and SOB intermittently since yesterday. Pt notes migratory HA yesterday bitemporal today occipital, intermittent, no clear palliative or provoking factors. Pt denies change in vision, numbness, paresthesias, weakness, difficulty speaking/swallowing/walking, n/v, f/c. Pt also notes SOB occurring intermittently, not sure if it is just her being nervous, experienced today and yesterday while visiting the NICU. Pt denies hemoptysis, recent leg pain, edema, dysuria, hematuria, frothy urine, CP, personal or FHx of DVT/PE. No current SOB. No h/o HTN during or prior to pregnancy.

## 2019-09-11 NOTE — ED PROVIDER NOTE - ATTENDING CONTRIBUTION TO CARE
Patient presenting complaining of frontal headaches beginning earlier this evening.  No medications to date for pain.  Pain is pressure like, bifrontal.  No associated visual changes, numbness, tingling and weakness, slurred speech, difficulty ambulating.  No history of similar prior headaches.  Recently post partum after 30 week delivery secondary to PPROM, infant doing well in NICU.    A 14 point review of systems is negative except as in HPI or otherwise documented.    Exam:  General: Patient well appearing, hypertensive on arrival and in Emergency Department vital signs within normal limits  HEENT: airway patent with moist mucous membranes  Cardiac: RRR S1/S2 with strong peripheral pulses  Respiratory: lungs clear without respiratory distress  GI: abdomen soft, non tender, non distended  Neuro: CN II-XII intact, normal strength, sensation, coordination and ambulation  Skin: warm, well perfused  Psych: normal mood and affect    Patient presenting with new onset headaches postpartum associated with hypertension.  BP on arrival very close to treatment threshold for pre-ecclapmsia, q15min BP's obtained and did not cross into treatment range but borderline.  Discussed with OBGYN who agreed and will evaluate patient in Emergency Department.  Will obtain labs to screen for possible postpartum preecclampsia, CT head obtained for screening with non specific frontal lesion.  Discussed with neuroradiology, they are recommending MRI to further classify lesion as it is difficult to determine on CT.  Neurology consulted in Emergency Department.  Will observe patient in CDU for serial neurologic checks, BP monitoring, treatment of headache and MRI to further classify findings on CT with OBGYN and neurology following.

## 2019-09-11 NOTE — ED ADULT NURSE NOTE - OBJECTIVE STATEMENT
Ambulatory pt G1:P1  on 19 premature baby at 30wks due to PPROM.  Pt denies any other complications during pregnancy, no pre-eclampsia.  Was upstairs in NICU when she began to experience mild headache and was sent here and found to have elevated /91 in triage.  Pt feeling otherwise well, conversive, abd soft/nt/nd, skin wdi, denies cp, sob, abd pain, nvd, urinary symptoms, vision change, numbness/tingling.

## 2019-09-11 NOTE — ED PROVIDER NOTE - PROGRESS NOTE DETAILS
c/d/w OBGYN, agree with ED w/u, continue [id number] BP checks, will see pt after CTH resulted. -Fabricio Singleton PA-C [place], PGY1 - Received signout on this patient. Plan for Ob and neuro consult in the ED, need for MRI to further evaluate frontal lobe. Neurology team paged. Discussed pt with OB resident who saw patient in the ED. Discussed elevated BPs 130s-140s and HA improvement with tylenol. They do not consider this patient with severe pre-eclampsia at this time, want to hold off on ppx Mg. Discussed CT results and rec for MRI, we will consult neurology and f/u with Ob regarding their recommendations. Also continue monitoring BP and reassess HA. Discussed case with neurology team - agreed to come see pt in the ED Pt reassessed - states HA is 5/10, compared to 9/10 at onset. Discussed plan for neurology eval and MRI. Pt agreeable with plan Discussed pt with OB resident who saw patient in the ED. Discussed elevated BPs 130s-140s and HA improvement with tylenol. They do not consider this patient with severe pre-eclampsia at this time, need CXR to eval for pulm edema. Want to hold off on ppx Mg at this time. Discussed CT results and rec for MRI, we will consult neurology and f/u with Ob regarding their recommendations. Also continue monitoring BP and reassess HA. Spoke to CDU - agree with come see patient and eval for CDU Spoke to CDU - agree with come see patient and eval for CDU, bed reserved pending neuro recs Pt reassessed - reports feeling better, reports she feels like she has to pump. Pt has pump with her.     Also spoke to neurology - said will see patient within the next hour. Paged neurology - he reports will see pt soon. Spoke to neurology - agree with plan for CDU for MRI. Recommend adding MRV to assess for venous sinus thrombosis given recent pregnancy and hypercoagulability. Will add.

## 2019-09-12 VITALS — HEART RATE: 97 BPM

## 2019-09-12 DIAGNOSIS — R51 HEADACHE: ICD-10-CM

## 2019-09-12 LAB
HAPTOGLOB SERPL-MCNC: 185 MG/DL — SIGNIFICANT CHANGE UP (ref 34–200)
LDH SERPL L TO P-CCNC: 160 U/L — SIGNIFICANT CHANGE UP (ref 50–242)

## 2019-09-12 PROCEDURE — 83735 ASSAY OF MAGNESIUM: CPT

## 2019-09-12 PROCEDURE — G0378: CPT

## 2019-09-12 PROCEDURE — 70551 MRI BRAIN STEM W/O DYE: CPT | Mod: 26

## 2019-09-12 PROCEDURE — 85730 THROMBOPLASTIN TIME PARTIAL: CPT

## 2019-09-12 PROCEDURE — 70551 MRI BRAIN STEM W/O DYE: CPT

## 2019-09-12 PROCEDURE — 81001 URINALYSIS AUTO W/SCOPE: CPT

## 2019-09-12 PROCEDURE — 85027 COMPLETE CBC AUTOMATED: CPT

## 2019-09-12 PROCEDURE — 99236 HOSP IP/OBS SAME DATE HI 85: CPT

## 2019-09-12 PROCEDURE — 80053 COMPREHEN METABOLIC PANEL: CPT

## 2019-09-12 PROCEDURE — 85385 FIBRINOGEN ANTIGEN: CPT

## 2019-09-12 PROCEDURE — 70545 MR ANGIOGRAPHY HEAD W/DYE: CPT

## 2019-09-12 PROCEDURE — 71046 X-RAY EXAM CHEST 2 VIEWS: CPT

## 2019-09-12 PROCEDURE — 99284 EMERGENCY DEPT VISIT MOD MDM: CPT | Mod: 25

## 2019-09-12 PROCEDURE — 83010 ASSAY OF HAPTOGLOBIN QUANT: CPT

## 2019-09-12 PROCEDURE — 84550 ASSAY OF BLOOD/URIC ACID: CPT

## 2019-09-12 PROCEDURE — 70450 CT HEAD/BRAIN W/O DYE: CPT

## 2019-09-12 PROCEDURE — 85610 PROTHROMBIN TIME: CPT

## 2019-09-12 PROCEDURE — 96374 THER/PROPH/DIAG INJ IV PUSH: CPT

## 2019-09-12 PROCEDURE — 83615 LACTATE (LD) (LDH) ENZYME: CPT

## 2019-09-12 PROCEDURE — 70545 MR ANGIOGRAPHY HEAD W/DYE: CPT | Mod: 26,59

## 2019-09-12 RX ORDER — KETOROLAC TROMETHAMINE 30 MG/ML
15 SYRINGE (ML) INJECTION ONCE
Refills: 0 | Status: DISCONTINUED | OUTPATIENT
Start: 2019-09-12 | End: 2019-09-12

## 2019-09-12 RX ADMIN — Medication 15 MILLIGRAM(S): at 18:26

## 2019-09-12 NOTE — ED CDU PROVIDER DISPOSITION NOTE - CLINICAL COURSE
35 y/o F,  PPROM @ 30wks s/p vaginal delivery 2019,  currently in NICU, otherwise unremarkable pregnancy course, no PMHx presents c/o HA x 3 days and SOB intermittently since yesterday. Pt notes migratory HA yesterday bitemporal today occipital, intermittent, no clear palliative or provoking factors. Pt denies change in vision, numbness, paresthesias, weakness, difficulty speaking/swallowing/walking, n/v, f/c. Pt also notes SOB occurring intermittently, not sure if it is just her being nervous, experienced today and yesterday while visiting the NICU. Pt denies hemoptysis, recent leg pain, edema, dysuria, hematuria, frothy urine, CP, personal or FHx of DVT/PE. No current SOB. No h/o HTN during or prior to pregnancy.  ED course: CT head without contrast +frontal lobe lucency, Neuro and OB following sent to CDU for MR Brain and MR venogram  MRI showed: 35 y/o F,  PPROM @ 30wks s/p vaginal delivery 2019,  currently in NICU, otherwise unremarkable pregnancy course, no PMHx presents c/o HA x 3 days and SOB intermittently since yesterday. Pt notes migratory HA yesterday bitemporal today occipital, intermittent, no clear palliative or provoking factors. Pt denies change in vision, numbness, paresthesias, weakness, difficulty speaking/swallowing/walking, n/v, f/c. Pt also notes SOB occurring intermittently, not sure if it is just her being nervous, experienced today and yesterday while visiting the NICU. Pt denies hemoptysis, recent leg pain, edema, dysuria, hematuria, frothy urine, CP, personal or FHx of DVT/PE. No current SOB. No h/o HTN during or prior to pregnancy.  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 Spoke with Neurosurgery- consulted on pt- pt to f/up outpt. Neuro and Ob- ok to f/up outpt.

## 2019-09-12 NOTE — CONSULT NOTE ADULT - ASSESSMENT
Assessment:    Impression:    Plan:  Imaging:  MRI brain without contrast    Medications:  Continue tylenol prn for headache    Disposition:  CDU    Case to be discussed with stroke attending, Dr. Mcdowell Assessment: 34 year old F s/p  on  after PPROM at 30 weeks who presents with headache for the past couple of days. Physical exam unremarkable. Initial vital signs show BP of 147/90 and HR of 111. CTH w/o shows right inferior frontal lucency.     Impression: Headache in the postpartum period possibly secondary to cerebral venous sinus thrombosis, RCVS, stroke, hypertension related, less likely infection/tumor    Plan:  Imaging:  MRI brain without contrast  MRV head without contrast    Medications:  Continue tylenol prn for headache    Disposition:  CDU    Case to be discussed with stroke attending, Dr. Mcdowell Assessment: 34 year old F s/p  on  after PPROM at 30 weeks who presents with headache for the past couple of days. Physical exam unremarkable. Initial vital signs show BP of 147/90 and HR of 111. CTH w/o shows right inferior frontal lucency.     Impression: Headache in the postpartum period possibly secondary to cerebral venous sinus thrombosis, RCVS, stroke, hypertension related, less likely infection/tumor    Plan:  Imaging:  MRI brain without contrast  MRV head with contrast    Medications:  Continue tylenol prn for headache    Disposition:  CDU    Case to be discussed with stroke attending, Dr. Mcdowell

## 2019-09-12 NOTE — CONSULT NOTE ADULT - SUBJECTIVE AND OBJECTIVE BOX
p ([id number])     HPI: 35 y/o F,  PPROM @ 30wks s/p vaginal delivery 2019,  currently in NICU, otherwise unremarkable pregnancy course, no PMHx presents c/o HA x 3 days and SOB intermittently since yesterday. Pt notes migratory HA yesterday bitemporal today occipital, intermittent, no clear palliative or provoking factors. Pt denies change in vision, numbness, paresthesias, weakness, difficulty speaking/swallowing/walking, n/v, f/c.      Imaging:    Exam: AOx3, FC, PERRL, EOMI, no facial   5/5 throughout, no drift  SILT  no clonus      --Anticoagulation:    =====================  PAST MEDICAL HISTORY    premature rupture of membranes    PAST SURGICAL HISTORY   No significant past surgical history        MEDICATIONS:  Antibiotics:    Neuro:    Other:      SOCIAL HISTORY:   Occupation:   Marital Status:     FAMILY HISTORY:      ROS: Negative except per HPI    LABS:  PT/INR - ( 11 Sep 2019 19:56 )   PT: 11.7 sec;   INR: 1.03 ratio         PTT - ( 11 Sep 2019 19:56 )  PTT:35.3 sec                        14.7   8.9   )-----------( 264      ( 11 Sep 2019 17:54 )             44.1         140  |  102  |  10  ----------------------------<  98  4.8   |  20<L>  |  0.68    Ca    9.6      11 Sep 2019 17:54  Mg     2.2         TPro  7.8  /  Alb  4.6  /  TBili  0.2  /  DBili  x   /  AST  38  /  ALT  33  /  AlkPhos  128<H>

## 2019-09-12 NOTE — ED CDU PROVIDER INITIAL DAY NOTE - OBJECTIVE STATEMENT
35 y/o F,  PPROM @ 30wks s/p vaginal delivery 2019,  currently in NICU, otherwise unremarkable pregnancy course, no PMHx presents c/o HA x 3 days and SOB intermittently since yesterday. Pt notes migratory HA yesterday bitemporal today occipital, intermittent, no clear palliative or provoking factors. Pt denies change in vision, numbness, paresthesias, weakness, difficulty speaking/swallowing/walking, n/v, f/c. Pt also notes SOB occurring intermittently, not sure if it is just her being nervous, experienced today and yesterday while visiting the NICU. Pt denies hemoptysis, recent leg pain, edema, dysuria, hematuria, frothy urine, CP, personal or FHx of DVT/PE. No current SOB. No h/o HTN during or prior to pregnancy.  ED course: CT head without contrast +frontal lobe lucency, Neuro and OB following sent to CDU for MR Brain and MR venogram

## 2019-09-12 NOTE — ED ADULT NURSE REASSESSMENT NOTE - NS ED NURSE REASSESS COMMENT FT1
/83, pt reports relief from headache, will continue to monitor BP Q15 min
Patient remains stable while in the ED, VS wnl, headache subsided, awaiting Neurology consult and MD disposition. Family at bedside.
Report taken from Barb FREDERICK. states pt have a good night with no complaints. Will continue to monitor.
await neuron consult; family at bedside
breast pumping in progress.
finished breast pumping; resting in bed; states headache had improved but is back now, rated 5 on pain scale; pt fully alert and oriented.
neuro eval still in progress.
neurology at bedside
pt ate sandwich earlier; resting in bed with family; await neuro consult
pt to CT scan
voided in bedside commode; denies dizziness.
Pt received from OTONIEL Richards. Pt oriented to CDU & plan of care was discussed. Pt A&O x 3. Pt in CDU for MRI in am. Pt denies any SOB, lightheadedness, or dizziness. Pt c/o 5/10 headache, pt declines any medication at this time. MRI checklist given to pt to fill out. Pt resting in bed with family at bedside. Safety & comfort measures maintained. Call bell in reach. Will continue to monitor.

## 2019-09-12 NOTE — PROGRESS NOTE ADULT - ASSESSMENT
A/P: 35yo two weeks s/p  now in the CDU for headache with lucency found on CT scan. Awaiting MRI.  Patient is stable and doing well post-partum.

## 2019-09-12 NOTE — CONSULT NOTE ADULT - SUBJECTIVE AND OBJECTIVE BOX
HPI: 34 year old F s/p  on  after PPROM at 30 weeks who presents with headache    NIHSS:   MRS:       REVIEW OF SYSTEMS  A 10-system ROS was performed and is negative except for those items noted above and/or in the HPI.    PAST MEDICAL & SURGICAL HISTORY:   premature rupture of membranes: 30 wks  No significant past surgical history    FAMILY HISTORY:    SOCIAL HISTORY:   T/E/D:   Occupation:   Lives with:     MEDICATIONS (HOME):  Home Medications:  acetaminophen 325 mg oral tablet: 3 tab(s) orally  (23 Aug 2019 13:06)  ibuprofen 600 mg oral tablet: 1 tab(s) orally every 6 hours (23 Aug 2019 13:06)  Prenatal Multivitamins with Folic Acid 1 mg oral tablet: 1 tab(s) orally once a day (23 Aug 2019 13:06)    MEDICATIONS  (STANDING):    MEDICATIONS  (PRN):    ALLERGIES/INTOLERANCES:  Allergies  No Known Allergies    Intolerances    VITALS & EXAMINATION:  Vital Signs Last 24 Hrs  T(C): 36.3 (12 Sep 2019 00:22), Max: 36.9 (11 Sep 2019 16:44)  T(F): 97.3 (12 Sep 2019 00:22), Max: 98.4 (11 Sep 2019 16:44)  HR: 104 (12 Sep 2019 00:22) (86 - 120)  BP: 129/86 (12 Sep 2019 00:22) (122/77 - 154/100)  BP(mean): 99 (12 Sep 2019 00:22) (99 - 99)  RR: 16 (12 Sep 2019 00:22) (16 - 20)  SpO2: 99% (12 Sep 2019 00:22) (99% - 100%)    General:  Constitutional: Female, appears stated age, in no apparent distress including pain  Head: Normocephalic & atraumatic.  ENT: Patent ear canals, intact TM, mucus membranes moist & pink, neck supple, no lymphadenopathy.   Respiratory: Patent airway. All lung fields are clear to auscultation bilaterally.  Extremities: No cyanosis, clubbing, or edema.  Skin: No rashes, bruising, or discoloration.    Cardiovascular (>2): RRR no murmurs. Carotid pulsations symmetric, no bruits. Normal capillary beds refill, 1-2 seconds or less.     Neurological (>12):  MS: Awake, alert, oriented to person, place, situation, time. Normal affect. Follows all commands.    Language: Speech is clear, fluent with good repetition & comprehension (able to name objects___)    CNs: PERRLA (R = 3mm, L = 3mm). VFF. EOMI no nystagmus, no diplopia. V1-3 intact to LT/pinprick, well developed masseter muscles b/l. No facial asymmetry b/l, full eye closure strength b/l. Hearing grossly normal (rubbing fingers) b/l. Symmetric palate elevation in midline. Gag reflex deferred. Head turning & shoulder shrug intact b/l. Tongue midline, normal movements, no atrophy.    Fundoscopic: pale w/ sharp discs margins No vascular changes.      Motor: Normal muscle bulk & tone. No noticeable tremor or seizure. No pronator drift.              Deltoid	Biceps	Triceps	Wrist	Finger ABd	   R	5	5	5	5	5		5 	  L	5	5	5	5	5		5    	H-Flex	H-Ext	H-ABd	H-ADd	K-Flex	K-Ext	D-Flex	P-Flex  R	5	5	5	5	5	5	5	5 	   L	5	5	5	5	5	5	5	5	     Sensation: Intact to LT/PP/Temp/Vibration/Position b/l throughout.     Cortical: Extinction on DSS (neglect): none    Reflexes:              Biceps(C5)       BR(C6)     Triceps(C7)               Patellar(L4)    Achilles(S1)    Plantar Resp  R	2	          2	             2		        2		    2		Down   L	2	          2	             2		        2		    2		Down     Coordination: intact rapid-alt movements. No dysmetria to FTN/HTS    Gait: Normal Romberg. No postural instability. Normal stance and tandem gait.     LABORATORY:  CBC                       14.7   8.9   )-----------( 264      ( 11 Sep 2019 17:54 )             44.1     Chem     140  |  102  |  10  ----------------------------<  98  4.8   |  20<L>  |  0.68    Ca    9.6      11 Sep 2019 17:54  Mg     2.2         TPro  7.8  /  Alb  4.6  /  TBili  0.2  /  DBili  x   /  AST  38  /  ALT  33  /  AlkPhos  128<H>      LFTs LIVER FUNCTIONS - ( 11 Sep 2019 17:54 )  Alb: 4.6 g/dL / Pro: 7.8 g/dL / ALK PHOS: 128 U/L / ALT: 33 U/L / AST: 38 U/L / GGT: x           Coagulopathy PT/INR - ( 11 Sep 2019 19:56 )   PT: 11.7 sec;   INR: 1.03 ratio         PTT - ( 11 Sep 2019 19:56 )  PTT:35.3 sec  Lipid Panel   A1c   Cardiac enzymes     U/A Urinalysis Basic - ( 11 Sep 2019 17:54 )    Color: Light Yellow / Appearance: Clear / S.003 / pH: x  Gluc: x / Ketone: Negative  / Bili: Negative / Urobili: Negative   Blood: x / Protein: Negative / Nitrite: Negative   Leuk Esterase: Negative / RBC: 0 /hpf / WBC 1 /HPF   Sq Epi: x / Non Sq Epi: 0 /hpf / Bacteria: Negative      CSF  Immunological  Other    STUDIES & IMAGING:  Studies (EKG, EEG, EMG, etc):     Radiology (XR, CT, MR, U/S, TTE/TOYA): HPI: 34 year old F s/p  on  after PPROM at 30 weeks who presents with headache for the past couple of days. Patient is unable to characterize onset of the headache. The headache improves with lying down. It is described as a dull constant aching initially 7-8/10, currently 6-7/10. It is located occipitally and radiates to the parietal region. Sometimes it is located in the frontal region. The headaches last for several hours. They do not awaken her from sleep. The headache is associated sometimes with phonophobia. She states that she has been feeling stressed because her infant is in the NICU. She denies photophobia, vision changes, dizziness, tinnitus, hearing changes, nausea, or vomiting. Patient states that previously she would get headaches rarely that would resolve without medications.  Patient's  states there is a stray cat that leaves excretions in their environment sometimes.    NIHSS: 0  MRS:  0      REVIEW OF SYSTEMS  A 10-system ROS was performed and is negative except for those items noted above and/or in the HPI.    PAST MEDICAL & SURGICAL HISTORY:   premature rupture of membranes: 30 wks  No significant past surgical history    FAMILY HISTORY:    SOCIAL HISTORY:   T/E/D: denies  Lives with:     MEDICATIONS (HOME):  Home Medications:  acetaminophen 325 mg oral tablet: 3 tab(s) orally  (23 Aug 2019 13:06)  ibuprofen 600 mg oral tablet: 1 tab(s) orally every 6 hours (23 Aug 2019 13:06)  Prenatal Multivitamins with Folic Acid 1 mg oral tablet: 1 tab(s) orally once a day (23 Aug 2019 13:06)    MEDICATIONS  (STANDING):    MEDICATIONS  (PRN):    ALLERGIES/INTOLERANCES:  Allergies  No Known Allergies    Intolerances    VITALS & EXAMINATION:  Vital Signs Last 24 Hrs  T(C): 36.3 (12 Sep 2019 00:22), Max: 36.9 (11 Sep 2019 16:44)  T(F): 97.3 (12 Sep 2019 00:22), Max: 98.4 (11 Sep 2019 16:44)  HR: 104 (12 Sep 2019 00:22) (86 - 120)  BP: 129/86 (12 Sep 2019 00:22) (122/77 - 154/100)  BP(mean): 99 (12 Sep 2019 00:22) (99 - 99)  RR: 16 (12 Sep 2019 00:22) (16 - 20)  SpO2: 99% (12 Sep 2019 00:22) (99% - 100%)    General: Female, appears stated age, in no apparent distress including pain    Neurological (>12):  MS: Awake, alert, oriented to person, place, situation, time. Normal affect. Follows all commands.    Language: Speech is clear, fluent with good repetition & comprehension (able to name objects)    CNs: PERRLA (R = 3mm, L = 3mm). VFF. EOMI no nystagmus, no diplopia. V1-3 intact to LT. No facial asymmetry b/l, full eye closure strength b/l. Hearing grossly normal (rubbing fingers) b/l. Symmetric palate elevation in midline. Gag reflex deferred. Head turning & shoulder shrug intact b/l. Tongue midline, normal movements, no atrophy.    Fundoscopic: unable to get a clear view to the fundus     Motor: Normal muscle bulk & tone. No noticeable tremor or seizure. No pronator drift.              Deltoid	Biceps	Triceps	Wrist	Finger ABd	   R	5	5	5	5	5		5 	  L	5	5	5	5	5		5    	H-Flex	H-Ext	H-ABd	H-ADd	K-Flex	K-Ext	D-Flex	P-Flex  R	5	5	5	5	5	5	5	5 	   L	5	5	5	5	5	5	5	5	     Sensation: Intact to LT b/l throughout.     Cortical: Extinction on DSS (neglect): none    Reflexes:              Biceps(C5)       BR(C6)     Triceps(C7)               Patellar(L4)    Achilles(S1)    Plantar Resp  R	2	          2	             2		        2		    2		Down   L	2	          2	             2		        2		    2		Down     Coordination: No dysmetria to FTN/HTS    Gait: Normal Romberg. No postural instability. Normal stance and tandem gait.     No meningeal signs    LABORATORY:  CBC                       14.7   8.9   )-----------( 264      ( 11 Sep 2019 17:54 )             44.1     Chem     140  |  102  |  10  ----------------------------<  98  4.8   |  20<L>  |  0.68    Ca    9.6      11 Sep 2019 17:54  Mg     2.2         TPro  7.8  /  Alb  4.6  /  TBili  0.2  /  DBili  x   /  AST  38  /  ALT  33  /  AlkPhos  128<H>      LFTs LIVER FUNCTIONS - ( 11 Sep 2019 17:54 )  Alb: 4.6 g/dL / Pro: 7.8 g/dL / ALK PHOS: 128 U/L / ALT: 33 U/L / AST: 38 U/L / GGT: x           Coagulopathy PT/INR - ( 11 Sep 2019 19:56 )   PT: 11.7 sec;   INR: 1.03 ratio         PTT - ( 11 Sep 2019 19:56 )  PTT:35.3 sec  Lipid Panel   A1c   Cardiac enzymes     U/A Urinalysis Basic - ( 11 Sep 2019 17:54 )    Color: Light Yellow / Appearance: Clear / S.003 / pH: x  Gluc: x / Ketone: Negative  / Bili: Negative / Urobili: Negative   Blood: x / Protein: Negative / Nitrite: Negative   Leuk Esterase: Negative / RBC: 0 /hpf / WBC 1 /HPF   Sq Epi: x / Non Sq Epi: 0 /hpf / Bacteria: Negative      CSF  Immunological  Other    STUDIES & IMAGING:  Studies (EKG, EEG, EMG, etc):     Radiology (XR, CT, MR, U/S, TTE/TOYA):  < from: CT Head No Cont (19 @ 18:34) >  FINDINGS:    There is a lucency along the inferior aspect of the right frontal lobe of   uncertain significance. No acute hemorrhage, hydrocephalus, midline shift   or extra-axial collections are present. Other visualized osseous   structures are not remarkable in appearance.    The orbits, paranasal sinuses and tympanomastoid cavities are within   normal limits.    Impression:    Right inferior frontal lobe lucency. Further evaluation with   contrast-enhanced brain MRI is recommended for further evaluation.    < end of copied text >

## 2019-09-12 NOTE — ED CDU PROVIDER INITIAL DAY NOTE - DETAILS
Headache  -Neuro following, seen by OBGYN  -Vitals Headache  -Neuro following, seen by OBGYN  -Vitals every 4 hours  -Frequent reevaluations   -MRI in AM   -DW attending   -Tylenol for headache PRN

## 2019-09-12 NOTE — ED CDU PROVIDER DISPOSITION NOTE - CARE PROVIDERS DIRECT ADDRESSES
[email or web address]

## 2019-09-12 NOTE — ED CDU PROVIDER INITIAL DAY NOTE - PROGRESS NOTE DETAILS
Patient seen at bedside. VSS. Patient resting comfortably, no complaints.  Will reevaluate. - Tammi Glover PA-C CDU NOTE HARRIS Taylor: pt resting comfortably, feels well except for 6/10 HA- frontal. no other complaints. no new symptoms. denies numbness, tingling, weakness, dizziness, unsteadiness. NAD recent VSS. CDU NOTE HARRIS Taylor: pt seen by Neuro attending Dr. Lares- if MRI normal, pt can f/up in his office. 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 Neurosurgery contacted for consult. CDU NOTE HARRIS Taylor: pt resting comfortably, feels well except for 6/10 HA- frontal. no other complaints. no new symptoms. denies numbness, tingling, weakness, dizziness, unsteadiness. NAD recent VSS.  pt offered pain medication- declined. CDU NOTE HARRIS Taylor: spoke to Neurosurgery- evaluated pt, will discuss final report from radiology with their attending. CDU NOTE HARRIS Taylor: pt resting comfortably, feels well except for generalized HA and feeling anxious. NAD recent VSS. HR was elevated likely due to anxiety but improved. Neurosurgery came back answered all patient's questions, recommend d/c to f/up outpt with Dr. Barrera. spoke with Neuro Dr. Lares and Ob/gyn, ok with plan to f/up outpt.  pt stable for d/c per Dr. Steward. pt agreeable to Toradol, will give prior to d/c.

## 2019-09-12 NOTE — ED CDU PROVIDER DISPOSITION NOTE - ATTENDING CONTRIBUTION TO CARE
34y F  sp vaginal delivery on  here with c/o HA x3 days. Had CT imaging in ED which showed frontal lobe luncency. Dispo to CDU for MRIs in consultation with Nsx and OB. Pt had MR/MRV which showed tumor. Nsx d/w pt and ok for OP FU, no acute intervention. OB also aware. No acute OB issues, can FU as OP. At time of my evaluation pt still with mild HA, no longer SOB. Aware of all results and amenable to plan for OP FU.

## 2019-09-12 NOTE — CONSULT NOTE ADULT - ASSESSMENT
34F 2 weeks post partum p/w headache and no other symptoms found to have R frontal lobe lucency on CT. MRI brain shows T1 hypointense, T2 hyperintense nonenhancing lesion in the right anterior inferior frontal lobe. 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 Exam: Completely intact.  - No acute neurosurgical intervention  - F/u outpatient

## 2019-09-12 NOTE — CONSULT NOTE ADULT - ATTENDING COMMENTS
VASCULAR NEUROLOGY ATTENDING  The patient is seen and examined the history and imaging are reviewed. I agree with the resident note unless otherwise noted. Exam normal. HA resolved. Await MRI/V to guide further management.

## 2019-09-12 NOTE — PROGRESS NOTE ADULT - SUBJECTIVE AND OBJECTIVE BOX
OB Progress Note:  PPD    S: 33yo two weeks s/p  presented with headache found to have lucency on CT now awaiting MRI. No suspicion for preeclampsia given now normalizing BPs and normal labs. Patient feels well. Pain is well controlled. She is tolerating a regular diet and passing flatus. She is voiding spontaneously, and ambulating without difficulty. Denies CP/SOB. Denies lightheadedness/dizziness. Denies N/V.    O:  Vitals:   Vital Signs Last 24 Hrs  T(C): 36.8 (12 Sep 2019 03:36), Max: 36.9 (11 Sep 2019 16:44)  T(F): 98.3 (12 Sep 2019 03:36), Max: 98.4 (11 Sep 2019 16:44)  HR: 88 (12 Sep 2019 04:24) (84 - 120)  BP: 131/85 (12 Sep 2019 04:24) (118/82 - 154/100)  BP(mean): 99 (12 Sep 2019 00:22) (99 - 99)  RR: 18 (12 Sep 2019 03:36) (16 - 20)  SpO2: 97% (12 Sep 2019 03:36) (97% - 100%)          Labs:  Blood type: O Positive  Rubella IgG: RPR: Negative                          14.7   8.9 >-----------< 264    (  @ 17:54 )             44.1    19 @ 17:54      140  |  102  |  10  ----------------------------<  98  4.8   |  20<L>  |  0.68        Ca    9.6      11 Sep 2019 17:54  Mg     2.2         TPro  7.8  /  Alb  4.6  /  TBili  0.2  /  DBili  x   /  AST  38  /  ALT  33  /  AlkPhos  128<H>  19 @ 17:54          Physical Exam:  General: NAD  Abdomen: soft, non-tender, non-distended, fundus firm  Vaginal: Lochia wnl  Extremities: No swelling

## 2019-09-12 NOTE — ED CDU PROVIDER DISPOSITION NOTE - PATIENT PORTAL LINK FT
You can access the FollowMyHealth Patient Portal offered by [facility] by registering at the following website: [email or web address]. By joining [facility]’s FollowMyHealth portal, you will also be able to view your health information using other applications (apps) compatible with our system.

## 2019-09-12 NOTE — ED CDU PROVIDER DISPOSITION NOTE - NSFOLLOWUPINSTRUCTIONS_ED_ALL_ED_FT
1. Rest. Stay hydrated.   2. Continue your current home medications. You can take Tylenol 650mg every 6 hours for headache.   3. Follow-up with your medical doctor in 2-3 days.  Please call to make an appointment with Neurology, [phone]. Bring your results with you for follow-up.  4. Return to the ER if you have any new or worsening symptoms, change in vision, inability to ambulate vomiting, chest pain, difficulty breathing, fevers, chills, weakness, or any other concerns. 1. Rest. Stay hydrated.   2. Continue your current home medications. You can take Tylenol 650mg every 6 hours for headache.   3. Follow-up with your Ob/gyn in 2-3 days. Follow up with Neurologist Dr. Lares #[phone] in 2-3 days. Follow up with Neurosurgery Dr. Barrera #[phone] in 3-4 days, to follow up lesion found on MRI. Bring your results with you for follow-up.  4. Return to the ER if you have any new or worsening symptoms, change in vision, inability to ambulate vomiting, chest pain, difficulty breathing, fevers, chills, weakness and any other concerns.

## 2019-09-12 NOTE — ED CDU PROVIDER INITIAL DAY NOTE - PHYSICAL EXAMINATION
GEN: Pt in NAD, A&O x3.  EYES: Sclera white w/o injection, PERRLA, EOMI.   ENT: Head NCAT. Nose w/o deformity. No auricular TTP. MMM. Neck supple FROM.   RESP: No chest wall tenderness, CTA b/l, no wheezes, rales, or rhonchi.   CARDIAC: RRR, clear distinct S1, S2, no appreciable murmurs.  ABD: Abdomen soft, non-tender. No CVAT b/l.  VASC: 2+ radial and dorsalis pedis pulses b/l. No edema or calf tenderness.  NEURO: CN2-12 grossly intact. Normal and equal sensation and 5/5 strength UE and LE b/l. Pronator drift negative. Normal gait and gross cerebellar function. DTRs: 2+ biceps, triceps, patellar, and Achilles.  SKIN: No rashes on the trunk.

## 2019-09-12 NOTE — ED CDU PROVIDER DISPOSITION NOTE - CARE PROVIDER_API CALL
Shahbaz Barrera (MD)  Neurosurgery  General  [address] [facility], [address]  [place] [address]  Phone: [phone]  Fax: [phone]  Follow Up Time:     Harish Lares (DO)  Neurology; Vascular Neurology  [address] [facility] [address]  [place] [address]  Phone: [phone]  Fax: [phone]  Follow Up Time:

## 2019-09-12 NOTE — ED CDU PROVIDER INITIAL DAY NOTE - ATTENDING CONTRIBUTION TO CARE
33yo female s/p vaginal delivery 8/21/2019 p/w headache, CT with frontal lobe lucency, placed in CDU for pain control and MRI. Neuro intact with exception of subjective headache. BP not consistent with preeclampsia.

## 2019-09-13 ENCOUNTER — NON-APPOINTMENT (OUTPATIENT)
Age: 34
End: 2019-09-13

## 2019-09-13 ENCOUNTER — APPOINTMENT (OUTPATIENT)
Dept: CARDIOLOGY | Facility: CLINIC | Age: 34
End: 2019-09-13
Payer: COMMERCIAL

## 2019-09-13 VITALS
DIASTOLIC BLOOD PRESSURE: 83 MMHG | WEIGHT: 148 LBS | HEART RATE: 122 BPM | SYSTOLIC BLOOD PRESSURE: 144 MMHG | HEIGHT: 63 IN | BODY MASS INDEX: 26.22 KG/M2 | OXYGEN SATURATION: 99 %

## 2019-09-13 DIAGNOSIS — G93.9 DISORDER OF BRAIN, UNSPECIFIED: ICD-10-CM

## 2019-09-13 PROBLEM — O42.919: Chronic | Status: ACTIVE | Noted: 2019-09-11

## 2019-09-13 PROCEDURE — 99205 OFFICE O/P NEW HI 60 MIN: CPT

## 2019-09-13 PROCEDURE — 93000 ELECTROCARDIOGRAM COMPLETE: CPT

## 2019-09-13 RX ORDER — BACILLUS COAGULANS/INULIN 1B-250 MG
CAPSULE ORAL
Refills: 0 | Status: DISCONTINUED | COMMUNITY
End: 2019-09-13

## 2019-09-14 LAB — FIBRINOGEN AG PPP IA-MCNC: 420 MG/DL — HIGH (ref 180–350)

## 2019-09-16 ENCOUNTER — CLINICAL ADVICE (OUTPATIENT)
Age: 34
End: 2019-09-16

## 2019-09-20 ENCOUNTER — APPOINTMENT (OUTPATIENT)
Dept: CARDIOLOGY | Facility: CLINIC | Age: 34
End: 2019-09-20
Payer: COMMERCIAL

## 2019-09-20 ENCOUNTER — NON-APPOINTMENT (OUTPATIENT)
Age: 34
End: 2019-09-20

## 2019-09-20 ENCOUNTER — OUTPATIENT (OUTPATIENT)
Dept: OUTPATIENT SERVICES | Facility: HOSPITAL | Age: 34
LOS: 1 days | End: 2019-09-20
Payer: COMMERCIAL

## 2019-09-20 ENCOUNTER — OTHER (OUTPATIENT)
Age: 34
End: 2019-09-20

## 2019-09-20 ENCOUNTER — APPOINTMENT (OUTPATIENT)
Dept: CV DIAGNOSITCS | Facility: HOSPITAL | Age: 34
End: 2019-09-20

## 2019-09-20 VITALS — OXYGEN SATURATION: 99 % | DIASTOLIC BLOOD PRESSURE: 80 MMHG | SYSTOLIC BLOOD PRESSURE: 122 MMHG | HEART RATE: 97 BPM

## 2019-09-20 DIAGNOSIS — R94.31 ABNORMAL ELECTROCARDIOGRAM [ECG] [EKG]: ICD-10-CM

## 2019-09-20 DIAGNOSIS — R03.0 ELEVATED BLOOD-PRESSURE READING, W/OUT DIAGNOSIS OF HYPERTENSION: ICD-10-CM

## 2019-09-20 DIAGNOSIS — I25.10 ATHEROSCLEROTIC HEART DISEASE OF NATIVE CORONARY ARTERY WITHOUT ANGINA PECTORIS: ICD-10-CM

## 2019-09-20 PROCEDURE — 93306 TTE W/DOPPLER COMPLETE: CPT

## 2019-09-20 PROCEDURE — 93306 TTE W/DOPPLER COMPLETE: CPT | Mod: 26

## 2019-09-20 PROCEDURE — 99215 OFFICE O/P EST HI 40 MIN: CPT

## 2019-09-20 PROCEDURE — 93000 ELECTROCARDIOGRAM COMPLETE: CPT

## 2019-09-20 NOTE — PHYSICAL EXAM
[General Appearance - Well Developed] : well developed [Normal Appearance] : normal appearance [Well Groomed] : well groomed [General Appearance - Well Nourished] : well nourished [General Appearance - In No Acute Distress] : no acute distress [No Deformities] : no deformities [Eyelids - No Xanthelasma] : the eyelids demonstrated no xanthelasmas [Normal Conjunctiva] : the conjunctiva exhibited no abnormalities [Normal Oral Mucosa] : normal oral mucosa [No Oral Pallor] : no oral pallor [No Oral Cyanosis] : no oral cyanosis [Normal Jugular Venous V Waves Present] : normal jugular venous V waves present [Normal Jugular Venous A Waves Present] : normal jugular venous A waves present [Heart Rate And Rhythm] : heart rate and rhythm were normal [No Jugular Venous Jeffrey A Waves] : no jugular venous jeffrey A waves [Murmurs] : no murmurs present [Heart Sounds] : normal S1 and S2 [Exaggerated Use Of Accessory Muscles For Inspiration] : no accessory muscle use [Respiration, Rhythm And Depth] : normal respiratory rhythm and effort [Auscultation Breath Sounds / Voice Sounds] : lungs were clear to auscultation bilaterally [Abdomen Soft] : soft [Abdomen Tenderness] : non-tender [Abdomen Mass (___ Cm)] : no abdominal mass palpated [Gait - Sufficient For Exercise Testing] : the gait was sufficient for exercise testing [Abnormal Walk] : normal gait [Cyanosis, Localized] : no localized cyanosis [Nail Clubbing] : no clubbing of the fingernails [Petechial Hemorrhages (___cm)] : no petechial hemorrhages [] : no rash [Skin Color & Pigmentation] : normal skin color and pigmentation [Skin Lesions] : no skin lesions [No Venous Stasis] : no venous stasis [No Xanthoma] : no  xanthoma was observed [No Skin Ulcers] : no skin ulcer [Affect] : the affect was normal [Oriented To Time, Place, And Person] : oriented to person, place, and time [No Anxiety] : not feeling anxious [Mood] : the mood was normal [FreeTextEntry1] : Tachycardic

## 2019-09-20 NOTE — DISCUSSION/SUMMARY
[FreeTextEntry1] : 34 year-old woman with newly diagnosed brain tumor (thought to be benign) and 21 days post-partum with her son in the NICU presents for initial evaluation of elevated blood pressure with intermittent headaches.\par 1. Possible post-partum pre-eclampsia. Labwork including urinalysis while in the emergency room was unremarkable. No proteinuria. Start low dose labetalol 100 mg BID. Patient currently breast feeding. Elevated blood pressure and headache also may be due in part to feelings of stress. Patient discussed with neurologist Dr. Lares. She is following with neurology on September 17.\par 2. Follow-up in approximately one week.

## 2019-09-20 NOTE — HISTORY OF PRESENT ILLNESS
[FreeTextEntry1] : 34 year-old woman without significant past medical history presents for evaluation after recent emergency room visit. Ms. Jean Baptiste gave birth approximately 3 weeks ago at 30 weeks and her son has been in the NICU since giving birth. She was seen in the emergency room on September 12th with a headache and was found to have a brain tumor on brain MRI. The tumor appears benign according to official radiology report. The patient has been taking her blood pressure frequently since leaving the hospital and her blood pressure has been as high as 170/100. She denies chest discomfort, denies shortness of breath or dyspnea on exertion, denies palpitations, denies syncope or pre-syncope.

## 2019-09-23 ENCOUNTER — APPOINTMENT (OUTPATIENT)
Dept: NEUROSURGERY | Facility: CLINIC | Age: 34
End: 2019-09-23
Payer: COMMERCIAL

## 2019-09-23 VITALS
HEIGHT: 64 IN | DIASTOLIC BLOOD PRESSURE: 77 MMHG | BODY MASS INDEX: 25.61 KG/M2 | WEIGHT: 150 LBS | OXYGEN SATURATION: 98 % | RESPIRATION RATE: 16 BRPM | SYSTOLIC BLOOD PRESSURE: 117 MMHG | HEART RATE: 77 BPM

## 2019-09-23 DIAGNOSIS — Z78.9 OTHER SPECIFIED HEALTH STATUS: ICD-10-CM

## 2019-09-23 DIAGNOSIS — R90.89 OTHER ABNORMAL FINDINGS ON DIAGNOSTIC IMAGING OF CENTRAL NERVOUS SYSTEM: ICD-10-CM

## 2019-09-23 PROCEDURE — 99203 OFFICE O/P NEW LOW 30 MIN: CPT

## 2019-09-24 ENCOUNTER — NON-APPOINTMENT (OUTPATIENT)
Age: 34
End: 2019-09-24

## 2019-09-24 PROBLEM — R94.31 ABNORMAL EKG: Status: ACTIVE | Noted: 2019-09-24

## 2019-09-24 PROBLEM — R03.0 ELEVATED BLOOD PRESSURE READING WITHOUT DIAGNOSIS OF HYPERTENSION: Status: ACTIVE | Noted: 2019-09-13

## 2019-09-24 NOTE — HISTORY OF PRESENT ILLNESS
[FreeTextEntry1] : 34 year-old woman without significant past medical history presents for evaluation after recent emergency room visit. Ms. Jean Baptiste gave birth approximately 3 weeks ago at 30 weeks and her son has been in the NICU since giving birth. She was seen in the emergency room on September 12th with a headache and was found to have a brain tumor on brain MRI. The patient had noted that her blood pressure was elevated to more than 140s/90s mmHg. The patient has been taking her blood pressure frequently since leaving the hospital and her blood pressure has been as high as 170/100. She was seen by neurology on September 19th and told that brain tumor seen on brain MRI is likely benign and likely has been present since birth. She is to have a follow-up brain MRI in 6 months. Her stress level has significantly improved since being told this. She denies chest discomfort, denies shortness of breath or dyspnea on exertion, denies palpitations, denies syncope or pre-syncope. She took two doses of labetalol but stopped the medication after feeling as if she was having a reaction to the medication..

## 2019-09-24 NOTE — PHYSICAL EXAM
[General Appearance - Well Developed] : well developed [Normal Appearance] : normal appearance [Well Groomed] : well groomed [General Appearance - Well Nourished] : well nourished [No Deformities] : no deformities [General Appearance - In No Acute Distress] : no acute distress [Normal Conjunctiva] : the conjunctiva exhibited no abnormalities [Eyelids - No Xanthelasma] : the eyelids demonstrated no xanthelasmas [Normal Oral Mucosa] : normal oral mucosa [No Oral Pallor] : no oral pallor [No Oral Cyanosis] : no oral cyanosis [Normal Jugular Venous A Waves Present] : normal jugular venous A waves present [Normal Jugular Venous V Waves Present] : normal jugular venous V waves present [No Jugular Venous Jeffrey A Waves] : no jugular venous jeffrey A waves [Respiration, Rhythm And Depth] : normal respiratory rhythm and effort [Exaggerated Use Of Accessory Muscles For Inspiration] : no accessory muscle use [Auscultation Breath Sounds / Voice Sounds] : lungs were clear to auscultation bilaterally [Heart Rate And Rhythm] : heart rate and rhythm were normal [Heart Sounds] : normal S1 and S2 [Murmurs] : no murmurs present [Abdomen Soft] : soft [Abdomen Tenderness] : non-tender [Abdomen Mass (___ Cm)] : no abdominal mass palpated [Abnormal Walk] : normal gait [Nail Clubbing] : no clubbing of the fingernails [Gait - Sufficient For Exercise Testing] : the gait was sufficient for exercise testing [Petechial Hemorrhages (___cm)] : no petechial hemorrhages [Cyanosis, Localized] : no localized cyanosis [Skin Color & Pigmentation] : normal skin color and pigmentation [No Venous Stasis] : no venous stasis [Skin Lesions] : no skin lesions [] : no rash [No Skin Ulcers] : no skin ulcer [No Xanthoma] : no  xanthoma was observed [Affect] : the affect was normal [Oriented To Time, Place, And Person] : oriented to person, place, and time [Mood] : the mood was normal [No Anxiety] : not feeling anxious [FreeTextEntry1] : Tachycardic

## 2019-10-02 PROBLEM — Z78.9 NON-SMOKER: Status: ACTIVE | Noted: 2018-11-09

## 2019-10-02 PROBLEM — R90.89 ABNORMAL FINDING ON MRI OF BRAIN: Status: ACTIVE | Noted: 2019-09-23

## 2019-10-07 NOTE — PHYSICAL EXAM
[General Appearance - Well Nourished] : well nourished [General Appearance - In No Acute Distress] : in no acute distress [General Appearance - Alert] : alert [General Appearance - Well Developed] : well developed [Oriented To Time, Place, And Person] : oriented to person, place, and time [Impaired Insight] : insight and judgment were intact [Affect] : the affect was normal [Mood] : the mood was normal [Cranial Nerves Optic (II)] : visual acuity intact bilaterally,  pupils equal round and reactive to light [Cranial Nerves Trigeminal (V)] : facial sensation intact symmetrically [Cranial Nerves Oculomotor (III)] : extraocular motion intact [Cranial Nerves Facial (VII)] : face symmetrical [Cranial Nerves Vestibulocochlear (VIII)] : hearing was intact bilaterally [Cranial Nerves Accessory (XI - Cranial And Spinal)] : head turning and shoulder shrug symmetric [Cranial Nerves Glossopharyngeal (IX)] : tongue and palate midline [Cranial Nerves Hypoglossal (XII)] : there was no tongue deviation with protrusion [Motor Strength] : muscle strength was normal in all four extremities [Sensation Tactile Decrease] : light touch was intact [PERRL With Normal Accommodation] : pupils were equal in size, round, reactive to light, with normal accommodation [Hearing Threshold Finger Rub Not [place]] : hearing was normal [Neck Appearance] : the appearance of the neck was normal [] : no respiratory distress [Respiration, Rhythm And Depth] : normal respiratory rhythm and effort [Auscultation Breath Sounds / Voice Sounds] : lungs were clear to auscultation bilaterally [Edema] : there was no peripheral edema [Heart Sounds] : normal S1 and S2 [Abnormal Walk] : normal gait [Motor Tone] : muscle strength and tone were normal [Involuntary Movements] : no involuntary movements were seen [Skin Color & Pigmentation] : normal skin color and pigmentation [Romberg's Sign] : Romberg's sign was negtive [Limited Balance] : balance was intact [Tremor] : no tremor present

## 2019-10-07 NOTE — ASSESSMENT
[FreeTextEntry1] : Right frontal lobe abnormality/ lucency with intermittent headaches.  We will repeat an MRI in 3 months to evaluate for activity/progression.  She knows to report any changes in status.\par \par 1)  MRI Brain w/wo - 3 months\par 2)  RTO 3 months

## 2019-10-07 NOTE — REVIEW OF SYSTEMS
[As Noted in HPI] : as noted in HPI [Negative] : Endocrine [Abdominal Pain] : no abdominal pain [Vomiting] : no vomiting [Diarrhea] : no diarrhea [Dysuria] : no dysuria [Skin Wound] : no skin wound [Skin Lesions] : no skin lesions [Incontinence] : no incontinence [Easy Bleeding] : no tendency for easy bleeding [Itching] : no itching [Easy Bruising] : no tendency for easy bruising

## 2019-11-06 NOTE — CONSULT NOTE ADULT - PROVIDER SPECIALTY LIST ADULT
David Ramires  : 1968  Payor: Felipe Brown / Plan: [facility] / Product Type: PPO /  [id number] at [facility]  [address], [address]  [phone]   [phone]      OUTPATIENT PHYSICAL THERAPY: Daily Treatment Note 2019  Visit Count:  2    ICD-10: Treatment Diagnosis: right knee instability M25.361,   Muscle weakness M62.81    Pre-treatment Symptoms/Complaints:  Patient reports knee is still buckling at times. Pain: Initial: Pain Intensity 1: 0  Post Session:  0/10   Medications Last Reviewed:  2019  Updated Objective Findings:  None Today   TREATMENT:   THERAPEUTIC EXERCISE: (60 minutes):  Exercises per grid below to improve strength and balance. Required minimal verbal cues to promote proper body mechanics. Progressed resistance, range and repetitions as indicated. LAQ green TB x 10  Hs curls green TB x 20  6 inch step up and over x 15  SLS on airex bilaterally 2x hold 20 sec  Wall slides x 15  Leg extension unilaterally 15# 2 x 10  Standing hip abd  With # 4 2 x 12  Sit to stand without assistance 2 x 10  Lateral step ups on 6 inch # x 15  Nu step x 10 min level 2   Hip abd # 4 2 x 10  Knee ext machine # 10 2 x 10    Manual Therapy (  na    ): na    Therapeutic Modalities (  na   ):na      HEP: As directed    Treatment/Session Summary:    · Response to Treatment:  Patient tolerated treatment with no discomfort today. Patient demonstrated good effort with all exercises and indicated knee only felt like it was going to give during the stairs. Reviewed home exercises for proper form and clear understanding. · Communication/Consultation:  None today  · Equipment provided today:  blue and black theraband  · Recommendations/Intent for next treatment session: Next visit will focus on strengthening as well as SLS tasks.   Treatment Plan of Care Effective Dates:  10/30/19 to 19  Total Treatment Billable Duration: Neurosurgery eval plus 20 min  PT Patient Time In/Time Out  Time In: 0800  Time Out: 0900  Neel Bajwa PTA    Future Appointments   Date Time Provider Ayo Maldonado   11/13/2019 12:00 PM [facility]   11/18/2019  8:00 AM Deb Summers [facility]

## 2020-01-23 ENCOUNTER — FORM ENCOUNTER (OUTPATIENT)
Age: 35
End: 2020-01-23

## 2020-01-24 ENCOUNTER — OUTPATIENT (OUTPATIENT)
Dept: OUTPATIENT SERVICES | Facility: HOSPITAL | Age: 35
LOS: 1 days | End: 2020-01-24
Payer: COMMERCIAL

## 2020-01-24 ENCOUNTER — APPOINTMENT (OUTPATIENT)
Dept: MRI IMAGING | Facility: CLINIC | Age: 35
End: 2020-01-24
Payer: COMMERCIAL

## 2020-01-24 DIAGNOSIS — Z00.8 ENCOUNTER FOR OTHER GENERAL EXAMINATION: ICD-10-CM

## 2020-01-24 DIAGNOSIS — R90.89 OTHER ABNORMAL FINDINGS ON DIAGNOSTIC IMAGING OF CENTRAL NERVOUS SYSTEM: ICD-10-CM

## 2020-01-24 PROCEDURE — 70553 MRI BRAIN STEM W/O & W/DYE: CPT | Mod: 26

## 2020-01-24 PROCEDURE — 70553 MRI BRAIN STEM W/O & W/DYE: CPT

## 2020-02-03 ENCOUNTER — APPOINTMENT (OUTPATIENT)
Dept: NEUROLOGY | Facility: CLINIC | Age: 35
End: 2020-02-03
Payer: COMMERCIAL

## 2020-02-03 VITALS
BODY MASS INDEX: 25.61 KG/M2 | HEART RATE: 107 BPM | OXYGEN SATURATION: 99 % | RESPIRATION RATE: 16 BRPM | HEIGHT: 64 IN | SYSTOLIC BLOOD PRESSURE: 122 MMHG | WEIGHT: 150 LBS | DIASTOLIC BLOOD PRESSURE: 70 MMHG

## 2020-02-03 PROCEDURE — 99205 OFFICE O/P NEW HI 60 MIN: CPT

## 2020-02-03 RX ORDER — NIFEDIPINE 30 MG/1
30 TABLET, EXTENDED RELEASE ORAL DAILY
Qty: 30 | Refills: 1 | Status: DISCONTINUED | COMMUNITY
Start: 2019-09-16 | End: 2020-02-03

## 2020-02-03 RX ORDER — LABETALOL HYDROCHLORIDE 100 MG/1
100 TABLET, FILM COATED ORAL
Qty: 60 | Refills: 2 | Status: DISCONTINUED | COMMUNITY
Start: 2019-09-13 | End: 2020-02-03

## 2020-02-03 NOTE — PHYSICAL EXAM
[General Appearance - Alert] : alert [General Appearance - In No Acute Distress] : in no acute distress [Oriented To Time, Place, And Person] : oriented to person, place, and time [Affect] : the affect was normal [Impaired Insight] : insight and judgment were intact [Mood] : the mood was normal [Memory Recent] : recent memory was not impaired [Memory Remote] : remote memory was not impaired [Person] : oriented to person [Place] : oriented to place [Short Term Intact] : short term memory intact [Time] : oriented to time [Remote Intact] : remote memory intact [Registration Intact] : recent registration memory intact [Span Intact] : the attention span was normal [Visual Intact] : visual attention was ~T not ~L decreased [Concentration Intact] : normal concentrating ability [Naming Objects] : no difficulty naming common objects [Repeating Phrases] : no difficulty repeating a phrase [Writing A Sentence] : no difficulty writing a sentence [Fluency] : fluency intact [Comprehension] : comprehension intact [Reading] : reading intact [Current Events] : adequate knowledge of current events [Vocabulary] : adequate range of vocabulary [Past History] : adequate knowledge of personal past history [Cranial Nerves Optic (II)] : visual acuity intact bilaterally,  visual fields full to confrontation, pupils equal round and reactive to light [Cranial Nerves Trigeminal (V)] : facial sensation intact symmetrically [Cranial Nerves Oculomotor (III)] : extraocular motion intact [Cranial Nerves Facial (VII)] : face symmetrical [Cranial Nerves Vestibulocochlear (VIII)] : hearing was intact bilaterally [Cranial Nerves Glossopharyngeal (IX)] : tongue and palate midline [Cranial Nerves Accessory (XI - Cranial And Spinal)] : head turning and shoulder shrug symmetric [Cranial Nerves Hypoglossal (XII)] : there was no tongue deviation with protrusion [Motor Tone] : muscle tone was normal in all four extremities [Motor Strength] : muscle strength was normal in all four extremities [Sensation Tactile Decrease] : light touch was intact [Involuntary Movements] : no involuntary movements were seen [No Muscle Atrophy] : normal bulk in all four extremities [Abnormal Walk] : normal gait [Balance] : balance was intact [Sclera] : the sclera and conjunctiva were normal [Extraocular Movements] : extraocular movements were intact [Optic Disc Abnormality] : the optic disc were normal in size and color [Edema] : there was no peripheral edema [Respiration, Rhythm And Depth] : normal respiratory rhythm and effort [General Appearance - Well Nourished] : well nourished [General Appearance - Well-Appearing] : healthy appearing [Motor Handedness Right-Handed] : the patient is right hand dominant [Paresis Pronator Drift Right-Sided] : no pronator drift on the right [Paresis Pronator Drift Left-Sided] : no pronator drift on the left [Motor Strength Upper Extremities Bilaterally] : strength was normal in both upper extremities [Motor Strength Lower Extremities Bilaterally] : strength was normal in both lower extremities [Past-pointing] : there was no past-pointing [Tremor] : no tremor present [Dysdiadochokinesia Bilaterally] : not present [Coordination - Dysmetria Impaired Finger-to-Nose Bilateral] : not present [FreeTextEntry5] : MOST OF HER DISCS ARE SHARP, BUT FROM 9 TO 3 O'CLOCK IS NOT AS SHARP AS THE REST OF THE DISC.

## 2020-02-03 NOTE — DISCUSSION/SUMMARY
[FreeTextEntry1] : ABNORMAL MRI - Imaging reviewed with pt and . We discussed potential pathology of tumor, including DNET vs glioma. We discussed the option of resection, although non-urgently. Would repeat MRI in 4 months, and then re-evaluate. May do MRI without contrast (as lesion in non-enhancing) to limit exposure, particularly since she is breast-feeding. \par \par Fortunately, she does not appear to have uncontrollable seizures, albeit she is young. Unfortunately, I cannot reliable determine the underlying etiology by neuroimaging alone. The absence of calcification makes ganglioglioma less likely. The possibility of WHO II glioma remains. \par \par The abnormal portion of the brain is absolutely not useful in terms of brain functioning. It is not eloquent. Moreover, she might develop intractable epilepsy even if it is a benign dysplastic lesion.  I recommend resection. I do not think biopsy offers less risk.\par \par DISPO – Urgent resection is unnecessary. She will return in four months’ time with a repeat MRI without contrast as none of the lesion enhanced on the last scan.\par

## 2020-02-03 NOTE — HISTORY OF PRESENT ILLNESS
[FreeTextEntry1] : This is a pleasant 34 year old RH woman from [place] with no significant PMH, now with abnormal MRI. \par \par Briefly, she gave birth to a baby boy on 8/21/19 by vaginal delivery at 30 weeks. \par After delivery, she developed severe headaches and blurry vision, as well as elevated BP. MRI showed a non-enhancing lesion in the right frontal lobe. \par She has been monitoring her BP at home and reports it has returned to normal, usually 110-120/70-80. 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 \par She continues to have headaches, usually 2-3 times per week, often associated with breast pumping. Headaches do not wake her from sleep. \par No n/v. No seizures or seizure-like symptoms. No weakness. \par No trouble with thinking or speech. \par \par

## 2020-02-03 NOTE — DATA REVIEWED
[de-identified] : I personally reviewed neuroimaging dated\par 9/12/19	1/24/20	9/11/19 (CT)	\par \par In reviewing these images, I find A PARTIALLY CYSTIC, NONENHANCING RIGHT ANTERIOR FRONTAL CORTICAL AND SUBCORTICAL T2 HYPERINTENSITY WITH SIGNAL CHANGES MOST CONSISTENT WITH A FOCUS OF CORTICAL DYSPLASIA, LOW GRADE INFILTRATIVE GLIOMA (“GLIO-NEURONAL NEOPLASM”), OR DYSEMBRYOPLASTIC NEUROEPITHELIAL TUMOR.\par Selected imaging was provided to the patient, along with a detailed verbal explanation of the issues at hand as outlined above.\par

## 2020-06-01 ENCOUNTER — APPOINTMENT (OUTPATIENT)
Dept: NEUROLOGY | Facility: CLINIC | Age: 35
End: 2020-06-01

## 2020-06-01 ENCOUNTER — APPOINTMENT (OUTPATIENT)
Dept: MRI IMAGING | Facility: IMAGING CENTER | Age: 35
End: 2020-06-01

## 2024-04-23 NOTE — DISCUSSION/SUMMARY
[FreeTextEntry1] : 34 year-old woman with newly diagnosed brain tumor (thought to be benign) and about three weeks post-partum with her son in the NICU presents for follow-up evaluation of elevated blood pressure with intermittent headaches.\par 1. Blood pressure significantly improved now that patient's emotional stress level is improved. Headaches may be post-partum. Monitor patient off anti-hypertensives. Patient to check home blood pressures once daily at home.\par 2. ST-depressions on EKG. Patient with normal echocardiogram.\par 3. Follow-up in approximately one month.
Mel Mckeon(Attending)

## 2024-07-22 NOTE — PROGRESS NOTE ADULT - ATTENDING COMMENTS
Pt seen and examined. Agree with above. Awaiting head MRI. Will f/u results and if consult service needed. Unlikely preeclampsia but will continue to monitor BPs.    YAZMIN Banks 1 pair

## 2025-04-09 NOTE — CHART NOTE - NSCHARTNOTEFT_GEN_A_CORE
R4     Headache resolved. Patient asymptomatic. Does not meet criteria for preeclampsia at this time with mild range BPs and no symptoms. Awaiting MRI as per neurology recs. No acute postpartum or gyn issues at this time. Will continue to follow.       [id number]  [id number] This document is complete and the patient is ready for discharge.